# Patient Record
Sex: MALE | Race: BLACK OR AFRICAN AMERICAN | NOT HISPANIC OR LATINO | Employment: STUDENT | URBAN - METROPOLITAN AREA
[De-identification: names, ages, dates, MRNs, and addresses within clinical notes are randomized per-mention and may not be internally consistent; named-entity substitution may affect disease eponyms.]

---
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
## 2022-02-08 ENCOUNTER — OFFICE VISIT (OUTPATIENT)
Dept: PEDIATRICS CLINIC | Facility: CLINIC | Age: 22
End: 2022-02-08
Payer: COMMERCIAL

## 2022-02-08 VITALS
DIASTOLIC BLOOD PRESSURE: 88 MMHG | RESPIRATION RATE: 16 BRPM | HEART RATE: 72 BPM | WEIGHT: 282.4 LBS | BODY MASS INDEX: 39.53 KG/M2 | SYSTOLIC BLOOD PRESSURE: 118 MMHG | HEIGHT: 71 IN

## 2022-02-08 DIAGNOSIS — Z13.31 SCREENING FOR DEPRESSION: ICD-10-CM

## 2022-02-08 DIAGNOSIS — J45.20 MILD INTERMITTENT ASTHMA WITHOUT COMPLICATION: ICD-10-CM

## 2022-02-08 DIAGNOSIS — Z71.3 NUTRITIONAL COUNSELING: ICD-10-CM

## 2022-02-08 DIAGNOSIS — E03.8 OTHER SPECIFIED HYPOTHYROIDISM: ICD-10-CM

## 2022-02-08 DIAGNOSIS — Z00.00 ENCOUNTER FOR GENERAL ADULT MEDICAL EXAMINATION WITHOUT ABNORMAL FINDINGS: ICD-10-CM

## 2022-02-08 DIAGNOSIS — E03.8 HYPOTHYROIDISM DUE TO HASHIMOTO'S THYROIDITIS: ICD-10-CM

## 2022-02-08 DIAGNOSIS — Z71.82 EXERCISE COUNSELING: ICD-10-CM

## 2022-02-08 DIAGNOSIS — E06.3 HYPOTHYROIDISM DUE TO HASHIMOTO'S THYROIDITIS: ICD-10-CM

## 2022-02-08 DIAGNOSIS — E66.9 BMI (BODY MASS INDEX) PEDIATRIC, > 99% FOR AGE, OBESE CHILD, TERTIARY CARE INTERVENTION: ICD-10-CM

## 2022-02-08 DIAGNOSIS — Z23 ENCOUNTER FOR IMMUNIZATION: Primary | ICD-10-CM

## 2022-02-08 PROCEDURE — 92551 PURE TONE HEARING TEST AIR: CPT | Performed by: PEDIATRICS

## 2022-02-08 PROCEDURE — 99173 VISUAL ACUITY SCREEN: CPT | Performed by: PEDIATRICS

## 2022-02-08 PROCEDURE — 90471 IMMUNIZATION ADMIN: CPT | Performed by: PEDIATRICS

## 2022-02-08 PROCEDURE — 90686 IIV4 VACC NO PRSV 0.5 ML IM: CPT | Performed by: PEDIATRICS

## 2022-02-08 PROCEDURE — 96127 BRIEF EMOTIONAL/BEHAV ASSMT: CPT | Performed by: PEDIATRICS

## 2022-02-08 PROCEDURE — 90472 IMMUNIZATION ADMIN EACH ADD: CPT | Performed by: PEDIATRICS

## 2022-02-08 PROCEDURE — 90651 9VHPV VACCINE 2/3 DOSE IM: CPT | Performed by: PEDIATRICS

## 2022-02-08 PROCEDURE — 99395 PREV VISIT EST AGE 18-39: CPT | Performed by: PEDIATRICS

## 2022-02-08 NOTE — PATIENT INSTRUCTIONS
Please call me anytime  Work on our plans  email in 1-2 months and let me know how you are doing    This is a list of Counsellor/Psychology groups in our area:    Please consider calling your insurance company to make sure your health needs will be covered  Dr Joseph Hirsch and Jessica Roberts [address] [facility] [place] and [facility], [address]   [place] [phone]  Deja Henson [phone]  Dr Magda Graham [phone]  Dr Jesus Chapa (younger children)  [facility]    1  Encounter for immunization  - HPV VACCINE 9 VALENT IM  - influenza vaccine, quadrivalent, 0 5 mL, preservative-free, for adult and pediatric patients 6 mos+ (AFLURIA, FLUARIX, FLULAVAL, FLUZONE)    2  Screening for depression    3  Encounter for general adult medical examination without abnormal findings    4  BMI (body mass index) pediatric, > 99% for age, obese child, tertiary care intervention  - Lipid panel; Future  - Hemoglobin A1C; Future  - Comprehensive metabolic panel; Future  - CBC and differential; Future  - TSH, 3rd generation; Future  - T4, free; Future  - Ambulatory Referral to Pediatric Endocrinology; Future  - Ambulatory Referral to Nutrition Services; Future    5  Other specified hypothyroidism    - Ambulatory Referral to Pediatric Endocrinology; Future      My [place] Crisis Number: Alison Saul [phone]    National Suicide Hotline: 0-943.702.3341    Crisis Text Line: Text "Home" to 352386    Please call to schedule an appointment with psychiatrist/therapist:  [address] [facility]  [phone]      Wellness Visit for [facility]:   A wellness visit  is when you see your healthcare provider to get screened for health problems   Your healthcare provider will also give you advice on how to stay healthy  Write down your questions so you remember to ask them  Ask your healthcare provider how often you should have a wellness visit  What happens at a wellness visit:  Your healthcare provider will ask about your health, and your family history of health problems  This includes high blood pressure, heart disease, and cancer  He or she will ask if you have symptoms that concern you, if you smoke, and about your mood  You may also be asked about your intake of medicines, supplements, food, and alcohol  Any of the following may be done:  · Your weight  will be checked  Your height may also be checked so your body mass index (BMI) can be calculated  Your BMI shows if you are at a healthy weight  · Your blood pressure  and heart rate will be checked  Your temperature may also be checked  · Blood and urine tests  may be done  Blood tests may be done to check your cholesterol levels  Abnormal cholesterol levels increase your risk for heart disease and stroke  You may also need a blood or urine test to check for diabetes if you are at increased risk  Urine tests may be done to look for signs of an infection or kidney disease  · A physical exam  includes checking your heartbeat and lungs with a stethoscope  Your healthcare provider may also check your skin to look for sun damage  · Screening tests  may be recommended  A screening test is done to check for diseases that may not cause symptoms  The screening tests you may need depend on your age, gender, family history, and lifestyle habits  For example, colorectal screening may be recommended if you are 48years old or older  Screening tests you need if you are a woman:   · A Pap smear  is used to screen for cervical cancer  Pap smears are usually done every 3 to 5 years depending on your age   You may need them more often if you have had abnormal Pap smear test results in the past  Ask your healthcare provider how often you should have a Pap smear  · A mammogram  is an x-ray of your breasts to screen for breast cancer  Experts recommend mammograms every 2 years starting at age 48 years  You may need a mammogram at age 52 years or younger if you have an increased risk for breast cancer  Talk to your healthcare provider about when you should start having mammograms and how often you need them  Vaccines you may need:   · Get an influenza vaccine  every year  The influenza vaccine protects you from the flu  Several types of viruses cause the flu  The viruses change over time, so new vaccines are made each year  · Get a tetanus-diphtheria (Td) booster vaccine  every 10 years  This vaccine protects you against tetanus and diphtheria  Tetanus is a severe infection that may cause painful muscle spasms and lockjaw  Diphtheria is a severe bacterial infection that causes a thick covering in the back of your mouth and throat  · Get a human papillomavirus (HPV) vaccine  if you are female and aged 23 to 32 or male 23 to 24 and never received it  This vaccine protects you from HPV infection  HPV is the most common infection spread by sexual contact  HPV may also cause vaginal, penile, and anal cancers  · Get a pneumococcal vaccine  if you are aged 72 years or older  The pneumococcal vaccine is an injection given to protect you from pneumococcal disease  Pneumococcal disease is an infection caused by pneumococcal bacteria  The infection may cause pneumonia, meningitis, or an ear infection  · Get a shingles vaccine  if you are 60 or older, even if you have had shingles before  The shingles vaccine is an injection to protect you from the varicella-zoster virus  This is the same virus that causes chickenpox  Shingles is a painful rash that develops in people who had chickenpox or have been exposed to the virus  How to eat healthy:  My Plate is a model for planning healthy meals   It shows the types and amounts of foods that should go on your plate  Fruits and vegetables make up about half of your plate, and grains and protein make up the other half  A serving of dairy is included on the side of your plate  The amount of calories and serving sizes you need depends on your age, gender, weight, and height  Examples of healthy foods are listed below:  · Eat a variety of vegetables  such as dark green, red, and orange vegetables  You can also include canned vegetables low in sodium (salt) and frozen vegetables without added butter or sauces  · Eat a variety of fresh fruits , canned fruit in 100% juice, frozen fruit, and dried fruit  · Include whole grains  At least half of the grains you eat should be whole grains  Examples include whole-wheat bread, wheat pasta, brown rice, and whole-grain cereals such as oatmeal     · Eat a variety of protein foods such as seafood (fish and shellfish), lean meat, and poultry without skin (turkey and chicken)  Examples of lean meats include pork leg, shoulder, or tenderloin, and beef round, sirloin, tenderloin, and extra lean ground beef  Other protein foods include eggs and egg substitutes, beans, peas, soy products, nuts, and seeds  · Choose low-fat dairy products such as skim or 1% milk or low-fat yogurt, cheese, and cottage cheese  · Limit unhealthy fats  such as butter, hard margarine, and shortening  Exercise:  Exercise at least 30 minutes per day on most days of the week  Some examples of exercise include walking, biking, dancing, and swimming  You can also fit in more physical activity by taking the stairs instead of the elevator or parking farther away from stores  Include muscle strengthening activities 2 days each week  Regular exercise provides many health benefits  It helps you manage your weight, and decreases your risk for type 2 diabetes, heart disease, stroke, and high blood pressure  Exercise can also help improve your mood   Ask your healthcare provider about the best exercise plan for you        General health and safety guidelines:   · Do not smoke  Nicotine and other chemicals in cigarettes and cigars can cause lung damage  Ask your healthcare provider for information if you currently smoke and need help to quit  E-cigarettes or smokeless tobacco still contain nicotine  Talk to your healthcare provider before you use these products  · Limit alcohol  A drink of alcohol is 12 ounces of beer, 5 ounces of wine, or 1½ ounces of liquor  · Lose weight, if needed  Being overweight increases your risk of certain health conditions  These include heart disease, high blood pressure, type 2 diabetes, and certain types of cancer  · Protect your skin  Do not sunbathe or use tanning beds  Use sunscreen with a SPF 15 or higher  Apply sunscreen at least 15 minutes before you go outside  Reapply sunscreen every 2 hours  Wear protective clothing, hats, and sunglasses when you are outside  · Drive safely  Always wear your seatbelt  Make sure everyone in your car wears a seatbelt  A seatbelt can save your life if you are in an accident  Do not use your cell phone when you are driving  This could distract you and cause an accident  Pull over if you need to make a call or send a text message  · Practice safe sex  Use latex condoms if are sexually active and have more than one partner  Your healthcare provider may recommend screening tests for sexually transmitted infections (STIs)  · Wear helmets, lifejackets, and protective gear  Always wear a helmet when you ride a bike or motorcycle, go skiing, or play sports that could cause a head injury  Wear protective equipment when you play sports  Wear a lifejacket when you are on a boat or doing water sports  © Copyright [employer] 2021 Information is for End User's use only and may not be sold, redistributed or otherwise used for commercial purposes   All illustrations and images included in CareNotes® are the copyrighted property of [employer]  or [facility]  The above information is an  only  It is not intended as medical advice for individual conditions or treatments  Talk to your doctor, nurse or pharmacist before following any medical regimen to see if it is safe and effective for you  Obesity   AMBULATORY CARE:   Obesity  is when your body mass index (BMI) is greater than 30  Your healthcare provider will use your height and weight to measure your BMI  The risks of obesity include  many health problems, including injuries or physical disability  You may need tests to check for the following:  · Diabetes    · High blood pressure or high cholesterol    · Heart disease    · Stroke    · Gallbladder or liver disease    · Cancer of the colon, breast, prostate, liver, or kidney    · Sleep apnea    · Arthritis or gout    Seek care immediately if:   · You have a severe headache, confusion, or difficulty speaking  · You have weakness on one side of your body  · You have chest pain, sweating, or shortness of breath  Call your doctor if:   · You have symptoms of gallbladder or liver disease, such as pain in your upper abdomen  · You have knee or hip pain and discomfort while walking  · You have symptoms of diabetes, such as intense hunger and thirst, and frequent urination  · You have symptoms of sleep apnea, such as snoring or daytime sleepiness  · You have questions or concerns about your condition or care  Treatment for obesity  focuses on helping you lose weight to improve your health  Even a small decrease in BMI can reduce the risk for many health problems  Your healthcare provider will help you set a weight-loss goal   · Lifestyle changes  are the first step in treating obesity  These include making healthy food choices and getting regular physical activity  Your healthcare provider may suggest a weight-loss program that involves coaching, education, and therapy      · Medicine  may help you lose weight when it is used with a healthy foods and physical activity  · Surgery  can help you lose weight if you are very obese and have other health problems  There are several types of weight-loss surgery  Ask your healthcare provider for more information  Be successful losing weight:   · Set small, realistic goals  An example of a small goal is to walk for 20 minutes 5 days a week  Anther goal is to lose 5% of your body weight  · Tell friends, family members, and coworkers about your goals  and ask for their support  Ask a friend to lose weight with you, or join a weight-loss support group  · Identify foods or triggers that may cause you to overeat , and find ways to avoid them  Remove tempting high-calorie foods from your home and workplace  Place a bowl of fresh fruit on your kitchen counter  If stress causes you to eat, then find other ways to cope with stress  A counselor or therapist may be able to help you  · Keep a diary to track what you eat and drink  Also write down how many minutes of physical activity you do each day  Weigh yourself once a week and record it in your diary  Eating changes: You will need to eat 500 to 1,000 fewer calories each day than you currently eat to lose 1 to 2 pounds a week  The following changes will help you cut calories:  · Eat smaller portions  Use small plates, no larger than 9 inches in diameter  Fill your plate half full of fruits and vegetables  Measure your food using measuring cups until you know what a serving size looks like  · Eat 3 meals and 1 or 2 snacks each day  Plan your meals in advance  Aman Cox and eat at home most of the time  Eat slowly  Do not skip meals  Skipping meals can lead to overeating later in the day  This can make it harder for you to lose weight  Talk with a dietitian to help you make a meal plan and schedule that is right for you      · Eat fruits and vegetables at every meal   They are low in calories and high in fiber, which makes you feel full  Do not add butter, margarine, or cream sauce to vegetables  Use herbs to season steamed vegetables  · Eat less fat and fewer fried foods  Eat more baked or grilled chicken and fish  These protein sources are lower in calories and fat than red meat  Limit fast food  Dress your salads with olive oil and vinegar instead of bottled dressing  · Limit the amount of sugar you eat  Do not drink sugary beverages  Limit alcohol  Activity changes:  Physical activity is good for your body in many ways  It helps you burn calories and build strong muscles  It decreases stress and depression, and improves your mood  It can also help you sleep better  Talk to your healthcare provider before you begin an exercise program   · Exercise for at least 30 minutes 5 days a week  Start slowly  Set aside time each day for physical activity that you enjoy and that is convenient for you  It is best to do both weight training and an activity that increases your heart rate, such as walking, bicycling, or swimming  · Find ways to be more active  Do yard work and housecleaning  Walk up the stairs instead of using elevators  Spend your leisure time going to events that require walking, such as outdoor festivals or fairs  This extra physical activity can help you lose weight and keep it off  Follow up with your doctor as directed: You may need to meet with a dietitian  Write down your questions so you remember to ask them during your visits  © Copyright [employer] 2021 Information is for End User's use only and may not be sold, redistributed or otherwise used for commercial purposes  All illustrations and images included in CareNotes® are the copyrighted property of [employer]  or [facility] Miguel Bledsoe   The above information is an  only  It is not intended as medical advice for individual conditions or treatments   Talk to your doctor, nurse or pharmacist before following any medical regimen to see if it is safe and effective for you  Cigarette Smoking and Your Health   AMBULATORY CARE:   Risks to your health if you smoke:  Nicotine and other chemicals found in tobacco and e-cigarettes can damage every cell in your body  Even if you are a light smoker, you have an increased risk for cancer, heart disease, and lung disease  If you are pregnant or have diabetes, smoking increases your risk for complications  Nicotine can affect an adolescent's developing brain  This can lead to trouble thinking, learning, or paying attention  Benefits to your health if you stop smoking:   · You decrease respiratory symptoms such as coughing, wheezing, and shortness of breath  · You reduce your risk for cancers of the lung, mouth, throat, kidney, bladder, pancreas, stomach, and cervix  If you already have cancer, you increase the benefits of chemotherapy  You also reduce your risk for cancer returning or a second cancer from developing  · You reduce your risk for heart disease, blood clots, heart attack, and stroke  · You reduce your risk for lung infections, and diseases such as pneumonia, asthma, chronic bronchitis, and emphysema  · Your circulation improves  More oxygen can be delivered to your body  If you have diabetes, you lower your risk for complications, such as kidney, artery, and eye diseases  You also lower your risk for nerve damage  Nerve damage can lead to amputations, poor vision, and blindness  · You improve your body's ability to heal and to fight infections  · An adolescent can help his or her brain and body develop in a healthy way  Talk to your adolescent about all the health risks of nicotine  If you can, start talking about nicotine when your child is younger than 12 years  This may make it easier for him or her not to start using nicotine as a teenager or adult  Explain to him or her that it is best never to start   It can be hard to try to quit later  Benefits to the health of others if you stop smoking:  Tobacco is harmful to nonsmokers who breathe in your secondhand smoke  The following are ways the health of others around you may improve when you stop smoking:  · You lower the risks for lung cancer and heart disease in nonsmoking adults  · If you are pregnant, you lower the risk for miscarriage, early delivery, low birth weight, and stillbirth  You also lower your baby's risk for SIDS, obesity, developmental delay, and neurobehavioral problems, such as ADHD  · If you have children, you lower their risk for ear infections, colds, pneumonia, bronchitis, and asthma  Follow up with your doctor as directed:  Write down your questions so you remember to ask them during your visits  For support and more information:   · American Lung Association  [address] [facility]  [address]  Phone: [facility] [address]  Phone: 5- 981 - [phone]  Web Address: [employer]    · Smokefree  gov  Phone: 0- 101 - 845-9460  Web Address: [employer] smokefree  [address] [place] [address] 2021 Information is for End User's use only and may not be sold, redistributed or otherwise used for commercial purposes  All illustrations and images included in CareNotes® are the copyrighted property of A D A M , Inc  or [address] [place], [place] [address]   The above information is an  only  It is not intended as medical advice for individual conditions or treatments  Talk to your doctor, nurse or pharmacist before following any medical regimen to see if it is safe and effective for you  Cholesterol and Your Health   AMBULATORY CARE:   Cholesterol  is a waxy, fat-like substance  Your body uses cholesterol to make hormones and new cells, and to protect nerves  Cholesterol is made by your body  It also comes from certain foods you eat, such as meat and dairy products  Your healthcare provider can help you set goals for your cholesterol levels   He or she can help you create a plan to meet your goals  Cholesterol level goals: Your cholesterol level goals depend on your risk for heart disease, your age, and your other health conditions  The following are general guidelines:  · Total cholesterol  includes low-density lipoprotein (LDL), high-density lipoprotein (HDL), and triglyceride levels  The total cholesterol level should be lower than 200 mg/dL and is best at about 150 mg/dL  · LDL cholesterol  is called bad cholesterol  because it forms plaque in your arteries  As plaque builds up, your arteries become narrow, and less blood flows through  When plaque decreases blood flow to your heart, you may have chest pain  If plaque completely blocks an artery that brings blood to your heart, you may have a heart attack  Plaque can break off and form blood clots  Blood clots may block arteries in your brain and cause a stroke  The level should be less than 130 mg/dL and is best at about 100 mg/dL  · HDL cholesterol  is called good cholesterol  because it helps remove LDL cholesterol from your arteries  It does this by attaching to LDL cholesterol and carrying it to your liver  Your liver breaks down LDL cholesterol so your body can get rid of it  High levels of HDL cholesterol can help prevent a heart attack and stroke  Low levels of HDL cholesterol can increase your risk for heart disease, heart attack, and stroke  The level should be 60 mg/dL or higher  · Triglycerides  are a type of fat that store energy from foods you eat  High levels of triglycerides also cause plaque buildup  This can increase your risk for a heart attack or stroke  If your triglyceride level is high, your LDL cholesterol level may also be high  The level should be less than 150 mg/dL      Any of the following can increase your risk for high cholesterol:   · Smoking cigarettes    · Being overweight or obese, or not getting enough exercise    · Drinking large amounts of alcohol    · A medical condition such as hypertension (high blood pressure) or diabetes    · Certain genes passed from your parents to you    · Age older than 65 years    What you need to know about having your cholesterol levels checked: Adults 21to 39years of age should have their cholesterol levels checked every 4 to 6 years  Adults 45 years or older should have their cholesterol checked every 1 to 2 years  You may need your cholesterol checked more often, or at a younger age, if you have risk factors for heart disease  You may also need to have your cholesterol checked more often if you have other health conditions, such as diabetes  Blood tests are used to check cholesterol levels  Blood tests measure your levels of triglycerides, LDL cholesterol, and HDL cholesterol  How healthy fats affect your cholesterol levels:  Healthy fats, also called unsaturated fats, help lower LDL cholesterol and triglyceride levels  Healthy fats include the following:  · Monounsaturated fats  are found in foods such as olive oil, canola oil, avocado, nuts, and olives  · Polyunsaturated fats,  such as omega 3 fats, are found in fish, such as salmon, trout, and tuna  They can also be found in plant foods such as flaxseed, walnuts, and soybeans  How unhealthy fats affect your cholesterol levels:  Unhealthy fats increase LDL cholesterol and triglyceride levels  They are found in foods high in cholesterol, saturated fat, and trans fat:  · Cholesterol  is found in eggs, dairy, and meat  · Saturated fat  is found in butter, cheese, ice cream, whole milk, and coconut oil  Saturated fat is also found in meat, such as sausage, hot dogs, and bologna  · Trans fat  is found in liquid oils and is used in fried and baked foods  Foods that contain trans fats include chips, crackers, muffins, sweet rolls, microwave popcorn, and cookies  Treatment  for high cholesterol will also decrease your risk of heart disease, heart attack, and stroke   Treatment may include any of the following:  · Lifestyle changes  may include food, exercise, weight loss, and quitting smoking  You may also need to decrease the amount of alcohol you drink  Your healthcare provider will want you to start with lifestyle changes  Other treatment may be added if lifestyle changes are not enough  Your healthcare provider may recommend you work with a team to manage hyperlipidemia  The team may include medical experts such as a dietitian, an exercise or physical therapist, and a behavior therapist  Your family members may be included in helping you create lifestyle changes  · Medicines  may be given to lower your LDL cholesterol, triglyceride levels, or total cholesterol level  You may need medicines to lower your cholesterol if any of the following is true:    ? You have a history of stroke, TIA, unstable angina, or a heart attack  ? Your LDL cholesterol level is 190 mg/dL or higher  ? You are age 36 to 76 years, have diabetes or heart disease risk factors, and your LDL cholesterol is 70 mg/dL or higher  · Supplements  include fish oil, red yeast rice, and garlic  Fish oil may help lower your triglyceride and LDL cholesterol levels  It may also increase your HDL cholesterol level  Red yeast rice may help decrease your total cholesterol level and LDL cholesterol level  Garlic may help lower your total cholesterol level  Do not take any supplements without talking to your healthcare provider  Food changes you can make to lower your cholesterol levels:  A dietitian can help you create a healthy eating plan  He or she can show you how to read food labels and choose foods low in saturated fat, trans fats, and cholesterol  · Decrease the total amount of fat you eat  Choose lean meats, fat-free or 1% fat milk, and low-fat dairy products, such as yogurt and cheese  Try to limit or avoid red meats  Limit or do not eat fried foods or baked goods, such as cookies      · Replace unhealthy fats with healthy fats   Cook foods in olive oil or canola oil  Choose soft margarines that are low in saturated fat and trans fat  Seeds, nuts, and avocados are other examples of healthy fats  · Eat foods with omega-3 fats  Examples include salmon, tuna, mackerel, walnuts, and flaxseed  Eat fish 2 times per week  Pregnant women should not eat fish that have high levels of mercury, such as shark, swordfish, and mary mackerel  · Increase the amount of high-fiber foods you eat  High-fiber foods can help lower your LDL cholesterol  Aim to get between 20 and 30 grams of fiber each day  Fruits and vegetables are high in fiber  Eat at least 5 servings each day  Other high-fiber foods are whole-grain or whole-wheat breads, pastas, or cereals, and brown rice  Eat 3 ounces of whole-grain foods each day  Increase fiber slowly  You may have abdominal discomfort, bloating, and gas if you add fiber to your diet too quickly  · Eat healthy protein foods  Examples include low-fat dairy products, skinless chicken and turkey, fish, and nuts  · Limit foods and drinks that are high in sugar  Your dietitian or healthcare provider can help you create daily limits for high-sugar foods and drinks  The limit may be lower if you have diabetes or another health condition  Limits can also help you lose weight if needed  Lifestyle changes you can make to lower your cholesterol levels:   · Maintain a healthy weight  Ask your healthcare provider what a healthy weight is for you  Ask him or her to help you create a weight loss plan if needed  Weight loss can decrease your total cholesterol and triglyceride levels  Weight loss may also help keep your blood pressure at a healthy level  · Be physically active throughout the day  Physical activity, such as exercise, can help lower your total cholesterol level and maintain a healthy weight  Physical activity can also help increase your HDL cholesterol level   Work with your healthcare provider to create an program that is right for you  Get at least 30 to 40 minutes of moderate physical activity most days of the week  Examples of exercise include brisk walking, swimming, or biking  Also include strength training at least 2 times each week  Your healthcare providers can help you create a physical activity plan  · Do not smoke  Nicotine and other chemicals in cigarettes and cigars can raise your cholesterol levels  Ask your healthcare provider for information if you currently smoke and need help to quit  E-cigarettes or smokeless tobacco still contain nicotine  Talk to your healthcare provider before you use these products  · Limit or do not drink alcohol  Alcohol can increase your triglyceride levels  Ask your healthcare provider before you drink alcohol  Ask how much is okay for you to drink in 24 hours or 1 week  Follow up with your doctor as directed:  Write down your questions so you remember to ask them during your visits  © Copyright [employer] 2021 Information is for End User's use only and may not be sold, redistributed or otherwise used for commercial purposes  All illustrations and images included in CareNotes® are the copyrighted property of A D A M , Inc  or [facility]   The above information is an  only  It is not intended as medical advice for individual conditions or treatments  Talk to your doctor, nurse or pharmacist before following any medical regimen to see if it is safe and effective for you

## 2022-02-08 NOTE — PROGRESS NOTES
Subjective:     Sourav Lantigua is a 23 y o  male who is brought in for this well child visit  History provided by: mother    Current Issues:  Current concerns: none  Well Child 12-18 Year     Interval problems- no ED visits  Nutrition-well balanced, fruit, veg and meats    Dental - q 6 months  Elimination- normal  Behavioral- no concerns  Sleep- through night- takes naps in the day sometimes  Can't sleep at night  Back pain resolved  Orthopedic for calf strain- better- did PT for a bit  Improved now  No been taking medication    In college at [employer]  Lifting 3 days per week, football and basketball    No synthroid taking in the last year  Off metformin also  Due to see endocrine  Wears glasses  Due for dental        Social Hx   : denies     SA :denies, interested in girl and boys romantically  Drugs: marijuana  regular use if has it  Tobacco/vaping: vaping- daily- working on getting of it  Alcohol: one day of the week with friends  In college  Feels safe at home and school  No peer pressures  No recent changes in sleep or behavior  Stress relief activities: denies  Denies SI/HI- denies  Confidential social history after child changed and mom was asked to step out of the room  Feels like sleeping more  Escaping feelings  More down then before  Depression in high school  Went with the flow and improved  Now feels low again  Has long term goals  Would like to travel and see the world  Nice relationships with mom and friends  Cares about others  Asking to have someone to talk to  Counselor  Safety  Home is child-proofed? Yes  There is no smoking in the home  Home has working smoke alarms? Yes  Home has working carbon monoxide alarms? Yes  There is an appropriate car seat in use         Screening  -risk for lead none  -risk for dislipidemia none  -risk for TB none  -risk for anemia none      The following portions of the patient's history were reviewed and updated as appropriate: allergies, current medications, past family history, past medical history, past social history, past surgical history and problem list           Objective:       Vitals:    02/08/22 1220   BP: 118/88   BP Location: Left arm   Patient Position: Sitting   Pulse: 72   Resp: 16   Weight: 128 kg (282 lb 6 4 oz)   Height: 5' 11 38" (1 813 m)     Growth parameters are noted and are appropriate for age  Wt Readings from Last 1 Encounters:   02/08/22 128 kg (282 lb 6 4 oz)     Ht Readings from Last 1 Encounters:   02/08/22 5' 11 38" (1 813 m)      Body mass index is 38 97 kg/m²  Vitals:    02/08/22 1220   BP: 118/88   BP Location: Left arm   Patient Position: Sitting   Pulse: 72   Resp: 16   Weight: 128 kg (282 lb 6 4 oz)   Height: 5' 11 38" (1 813 m)        Hearing Screening    125Hz 250Hz 500Hz 1000Hz 2000Hz 3000Hz 4000Hz 6000Hz 8000Hz   Right ear: 25 25 25 25 25 25 25 25 25   Left ear: 25 25 25 25 25 25 25 25 25      Visual Acuity Screening    Right eye Left eye Both eyes   Without correction:      With correction: 20/20 20/20 16       Physical Exam  Vitals and nursing note reviewed  Constitutional:       Appearance: Normal appearance  He is well-developed and normal weight  HENT:      Head: Normocephalic and atraumatic  Right Ear: Tympanic membrane, ear canal and external ear normal       Left Ear: Ear canal and external ear normal       Nose: Nose normal       Mouth/Throat:      Mouth: Mucous membranes are moist    Eyes:      Extraocular Movements: Extraocular movements intact  Conjunctiva/sclera: Conjunctivae normal       Pupils: Pupils are equal, round, and reactive to light  Cardiovascular:      Rate and Rhythm: Normal rate and regular rhythm  Pulmonary:      Effort: Pulmonary effort is normal       Breath sounds: Normal breath sounds  Abdominal:      General: Bowel sounds are normal  There is no distension  Palpations: Abdomen is soft     Musculoskeletal: General: Normal range of motion  Cervical back: Normal range of motion  Skin:     General: Skin is warm  Neurological:      General: No focal deficit present  Mental Status: He is alert and oriented to person, place, and time  Mental status is at baseline  Psychiatric:         Mood and Affect: Mood normal          Behavior: Behavior normal          Thought Content: Thought content normal          Judgment: Judgment normal        Polite,  Kind    Assessment:     Well adolescent  1  Encounter for immunization     2  Screening for depression     3  Encounter for general adult medical examination without abnormal findings          Plan:         1  Anticipatory guidance discussed  Specific topics reviewed: bicycle helmets, drugs, ETOH, and tobacco, importance of regular dental care, importance of regular exercise, importance of varied diet and limit TV, media violence  BMI Counseling: Body mass index is 38 97 kg/m²  The BMI is above normal  Nutrition recommendations include decreasing portion sizes, consuming healthier snacks, limiting drinks that contain sugar and reducing intake of cholesterol  Exercise recommendations include exercising 3-5 times per week and strength training exercises  Patient referred to nutritionist  Rationale for BMI follow-up plan is due to patient being overweight or obese  Depression Screening and Follow-up Plan: Patient's depression screening was positive with a PHQ-2 score of 5  Their PHQ-9 score was 16          2  Development: appropriate for age    1  Immunizations today: per orders  4  Follow-up visit in 1 year for next well child visit, or sooner as needed  Advised family on good growth and development for age today  Questions were answered regarding but not limited to sleep, dev, feeding for age, growth and behavior  Family appropriate and engaged in conversation    1  Encounter for immunization  Asking about booster COVID vaccine     - HPV VACCINE 9 VALENT IM  - influenza vaccine, quadrivalent, 0 5 mL, preservative-free, for adult and pediatric patients 6 mos+ (AFLURIA, FLUARIX, FLULAVAL, FLUZONE)    2  Screening for depression  counseling during the visit  List given of local couselors as asked  Advised on weaning down from daily Marajuana use  Barby Corcoran will work on this over the next month or 2 and call me with how he is feeling  Reviewed amotivational syndrome  No concerns for self harm at this time  Advised on ways to get help if needed  3  Encounter for general adult medical examination without abnormal findings      4  BMI (body mass index) pediatric, > 99% for age, obese child, tertiary care intervention    - Lipid panel; Future  - Hemoglobin A1C; Future  - Comprehensive metabolic panel; Future  - CBC and differential; Future  - TSH, 3rd generation; Future  - T4, free; Future  - Ambulatory Referral to Pediatric Endocrinology; Future  - Ambulatory Referral to Nutrition Services; Future    5  Other specified hypothyroidism    - Ambulatory Referral to Pediatric Endocrinology; Future    6  Hypothyroidism due to Hashimoto's thyroiditis  Endocrine referral  Blood work ordered  May wait until seeing endo before lab blood work done      7  Mild intermittent asthma without complication  contolled

## 2022-02-08 NOTE — LETTER
February 8, 2022     Patient: Sourav Lantigua   YOB: 2000   Date of Visit: 2/8/2022       To Whom it May Concern:    Sourav Lantigua is under my professional care  He was seen in my office on 2/8/2022  He may return to school on 2/8/22  Please excuse him from his missed class  If you have any questions or concerns, please don't hesitate to call           Sincerely,          Kenneth Gross MD

## 2022-03-03 ENCOUNTER — OFFICE VISIT (OUTPATIENT)
Dept: ENDOCRINOLOGY | Facility: CLINIC | Age: 22
End: 2022-03-03
Payer: COMMERCIAL

## 2022-03-03 VITALS
WEIGHT: 282.6 LBS | DIASTOLIC BLOOD PRESSURE: 78 MMHG | SYSTOLIC BLOOD PRESSURE: 128 MMHG | BODY MASS INDEX: 39.56 KG/M2 | HEART RATE: 110 BPM | HEIGHT: 71 IN

## 2022-03-03 DIAGNOSIS — E03.8 OTHER SPECIFIED HYPOTHYROIDISM: ICD-10-CM

## 2022-03-03 DIAGNOSIS — E55.9 VITAMIN D DEFICIENCY: ICD-10-CM

## 2022-03-03 DIAGNOSIS — E03.8 HYPOTHYROIDISM DUE TO HASHIMOTO'S THYROIDITIS: Primary | ICD-10-CM

## 2022-03-03 DIAGNOSIS — R79.89 ELEVATED LIVER FUNCTION TESTS: ICD-10-CM

## 2022-03-03 DIAGNOSIS — R73.03 PRE-DIABETES: ICD-10-CM

## 2022-03-03 DIAGNOSIS — E06.3 HYPOTHYROIDISM DUE TO HASHIMOTO'S THYROIDITIS: Primary | ICD-10-CM

## 2022-03-03 DIAGNOSIS — E66.9 BMI (BODY MASS INDEX) PEDIATRIC, > 99% FOR AGE, OBESE CHILD, TERTIARY CARE INTERVENTION: ICD-10-CM

## 2022-03-03 DIAGNOSIS — L83 ACANTHOSIS NIGRICANS: ICD-10-CM

## 2022-03-03 PROCEDURE — 99214 OFFICE O/P EST MOD 30 MIN: CPT | Performed by: INTERNAL MEDICINE

## 2022-03-03 NOTE — PROGRESS NOTES
Cc: hypothyroidism    Referring Provider  Isia Aragon [id number]     History of Present Illness:   Sayra Medellin is a 23 y o  male with past medical history hypothyroidism due to Hashimoto's disease since 2015 presents for follow-up  He was on levothyroxine 112 mcg which she stop taking it for last few months  He was diagnosed with hypothyroidism 2015 with significantly elevated TSH of 141 91 uIU/mL (0 35-4 00 uIU/L) and repeat TSH on 6/8/15 revealed TSH of 137 36 mIU/mL with low FT4 of 0 68 ng/dL  Thyroid antibodies done later revealed positive TPO antibody (182) and negative TG antibody (26)  He was started on levothyroxine (6/2015)  his most recent TFT showed TSH of 1 41 and free T4 of 0 88    He reports all day long fatigue and lack of energy, denies sleep  Disturbances, denies hair loss, brittle nails, dry skin, constipation, mood changes,    he denieschanges in neck, changes in voice, dysphagia   Radiation exposure to head/neck/chest    Fhx:   thyroid disorder- hypothyroidism in her grandmother      Patient Active Problem List   Diagnosis    Acanthosis nigricans    Hypothyroidism due to Hashimoto's thyroiditis    Family history of diabetes mellitus type II    Elevated liver function tests    Elevated cholesterol with elevated triglycerides    NAFLD (nonalcoholic fatty liver disease)    Mild intermittent asthma without complication      Past Medical History:   Diagnosis Date    Closed fracture of lateral portion of right tibial plateau with malunion 10/31/2018    Hypothyroidism     NAFLD (nonalcoholic fatty liver disease) 8/30/2016    Prediabetes     Superficial fungus infection of skin       Past Surgical History:   Procedure Laterality Date    NO PAST SURGERIES      ID OPEN RX BILAT TIB PLAT FX Right 11/1/2018    Procedure: OPEN REDUCTION W/ INTERNAL FIXATION (ORIF) TIBIAL PLATEAU;  Surgeon: Dory Carvalho MD;  Location: [facility];  Service: Orthopedics      Family History   Problem Relation Age of Onset    Hypothyroidism Maternal Grandmother     Thyroid disease unspecified Maternal Grandmother     Hypertension Paternal Grandmother     Rheum arthritis Paternal Grandmother     Diabetes Paternal Grandfather     Diabetes unspecified Paternal Grandfather     Other Mother         gastroparesis,prediabetic    JEANMARIE disease Mother    [facility] Migraines Mother     Hypertension Father     Diabetes unspecified Maternal Grandfather      Social History     Tobacco Use    Smoking status: Passive Smoke Exposure - Never Smoker    Smokeless tobacco: Never Used   Substance Use Topics    Alcohol use: No     Allergies   Allergen Reactions    Other      seasonal         Current Outpatient Medications:     levothyroxine 112 mcg tablet, Take 0 5 tablets (56 mcg total) by mouth daily (Patient not taking: Reported on 3/3/2022 ), Disp: 15 tablet, Rfl: 3    Omega-3 Fatty Acids (FISH OIL PO), Take 2 g by mouth (Patient not taking: Reported on 3/3/2022 ), Disp: , Rfl:   Review of Systems   Constitutional: Positive for fatigue and unexpected weight change  Negative for activity change, appetite change, chills, diaphoresis and fever  HENT: Negative for congestion, drooling, ear discharge, ear pain, trouble swallowing and voice change  Eyes: Negative for photophobia, pain, discharge, redness, itching and visual disturbance  Respiratory: Negative for cough, chest tightness, shortness of breath and wheezing  Cardiovascular: Negative for chest pain, palpitations and leg swelling  Gastrointestinal: Negative for abdominal distention, abdominal pain, blood in stool, diarrhea, nausea and vomiting  Endocrine: Negative for cold intolerance, heat intolerance, polydipsia, polyphagia and polyuria  Genitourinary: Negative for dysuria, flank pain, frequency and hematuria     Musculoskeletal: Negative for back pain, gait problem, joint swelling, myalgias, neck pain and neck stiffness  Skin: Negative for color change, pallor, rash and wound  Neurological: Negative for dizziness, tremors, seizures, syncope, speech difficulty, weakness, numbness and headaches  Psychiatric/Behavioral: Negative for agitation  All other systems reviewed and are negative  Physical Exam:  Body mass index is 39 41 kg/m²  /78   Pulse (!) 110   Ht 5' 11" (1 803 m)   Wt 128 kg (282 lb 9 6 oz)   BMI 39 41 kg/m²    Wt Readings from Last 3 Encounters:   03/03/22 128 kg (282 lb 9 6 oz)   02/08/22 128 kg (282 lb 6 4 oz)   03/25/21 135 kg (298 lb) (>99 %, Z= 3 03)*     * Growth percentiles are based on [facility] (Boys, 2-20 Years) data         GEN: NAD  E/n/m nl facies, hearing intact bilat, tongue midline, lips nl  Eyes: no stare or proptosis, nl lids and conjunctiva, EOMI  Neck: trachea midline, thyroid NT to palpation, nl in size, no nodules or neck masses noted, no cervical LAD  CV; heart reg rate s1s2 nl, no m/r/g appreciated, no CASSANDRA  Resp: CTAB, good effort  Ab+BS  Neuro: no tremor, 2+ DTRs in BUE  MS: no c/c in digits, moves all 4 ext, nl muscle bulk, gait nl  Skin: warm and dry, no palmar erythema  Ext: no c/c in digits, no edema bilaterally, 2+ DP and PT pulses bilat,  Psych: nl mood and affect, no gross lapses in memory    DATA:  Labs:     Component      Latest Ref Rng & Units 3/2/2022   Hemoglobin A1C      <5 7 % 5 7 (H)   eAG, EST AVG Glucose      <154 mg/dL 117     Thyroid Stimulating Hormone 0 36 - 3 74 uIU/mL 1 41      T4, Free 0 76 - 1 46 ng/dL 0 88       Ref Range & Units 3/2/22  7:14 AM   Glucose 65 - 99 mg/dL 97    BUN 7 - 28 mg/dL 12    Creatinine 0 53 - 1 30 mg/dL 1 30    Sodium 135 - 145 mmol/L 138    Potassium 3 5 - 5 2 mmol/L 4 3    Chloride 100 - 109 mmol/L 102    Carbon Dioxide 23 - 31 mmol/L 31    Calcium 8 5 - 10 1 mg/dL 10 3 High     Alkaline Phosphatase 35 - 120 U/L 108    Albumin 3 5 - 4 8 g/dL 4 6    Bilirubin, Total 0 2 - 1 0 mg/dL 1 1 High     Comment: Use of this assay is not recommended for patients undergoing treatment with eltrombopag due to the potential for falsely elevated results  Protein, Total 6 3 - 8 3 g/dL 8 0    AST <41 U/L 72 High     ALT <56 U/L 106 High     Anion Gap 3 - 11 5    eGFR, Non- >60 79    eGFR,  >60 92    eGFR Comment  Units: mL/min per 1 73 square meters    Comment:                                                   Impression:  1  Hypothyroidism due to Hashimoto's thyroiditis    2  BMI (body mass index) pediatric, > 99% for age, obese child, tertiary care intervention    3  Vitamin D deficiency    4  Acanthosis nigricans    5  Elevated liver function tests    6  Pre-diabetes           Plan:    Diagnoses and all orders for this visit:    Hypothyroidism due to Hashimoto's thyroiditis:  He is clinically euthyroid and biochemically, however he stop taking levothyroxine almost 6 months ago, previously on 112 mcg once daily but was not taking it consistently  As he is clinically and biochemically euthyroid will monitor his TFT and will resume levothyroxine if needed  He was instructed to call us if he develops any symptoms of hypothyroidism     -     T4, free Lab Collect; Future  -     TSH, 3rd generation Lab Collect; Future    BMI (body mass index) pediatric, > 99% for age, obese child, tertiary care intervention  -     Ambulatory Referral to Pediatric Endocrinology  -     Comprehensive metabolic panel Lab Collect; Future  -     HEMOGLOBIN A1C W/ EAG ESTIMATION Lab Collect; Future  -     T4, free Lab Collect; Future  -     TSH, 3rd generation Lab Collect; Future  -     Vitamin D 25 hydroxy Lab Collect; Future      Vitamin D deficiency; Was advised to start taking vitamin-D supplement 3000 units a day  -     Vitamin D 25 hydroxy Lab Collect; Future      Elevated liver function tests, likely due to nonalcoholic fatty liver disease:   We had a prolonged discussion regarding lifestyle modifications including regular daily exercise and balanced diet in order to lose weight  Has appointment with nutritionist in near future  Pre-diabetes; Referral to nutritionist     Lifestyle modification instruction including regular daily exercise and balanced diet was given           Discussed with the patient and all questioned fully answered  He will call me if any problems arise          Jerald Litten, MD

## 2022-08-10 ENCOUNTER — TRANSCRIBE ORDERS (OUTPATIENT)
Dept: LAB | Facility: HOSPITAL | Age: 22
End: 2022-08-10

## 2022-08-10 ENCOUNTER — LAB (OUTPATIENT)
Dept: LAB | Facility: HOSPITAL | Age: 22
End: 2022-08-10
Attending: PEDIATRICS
Payer: COMMERCIAL

## 2022-08-10 DIAGNOSIS — E06.3 HYPOTHYROIDISM DUE TO HASHIMOTO'S THYROIDITIS: ICD-10-CM

## 2022-08-10 DIAGNOSIS — Z13.0 ENCOUNTER FOR SICKLE-CELL SCREENING: ICD-10-CM

## 2022-08-10 DIAGNOSIS — E03.8 HYPOTHYROIDISM DUE TO HASHIMOTO'S THYROIDITIS: ICD-10-CM

## 2022-08-10 DIAGNOSIS — E66.9 BMI (BODY MASS INDEX) PEDIATRIC, > 99% FOR AGE, OBESE CHILD, TERTIARY CARE INTERVENTION: ICD-10-CM

## 2022-08-10 DIAGNOSIS — E55.9 VITAMIN D DEFICIENCY: ICD-10-CM

## 2022-08-10 DIAGNOSIS — Z13.0 ENCOUNTER FOR SICKLE-CELL SCREENING: Primary | ICD-10-CM

## 2022-08-10 LAB
25(OH)D3 SERPL-MCNC: 18.3 NG/ML (ref 30–100)
ALBUMIN SERPL BCP-MCNC: 4.7 G/DL (ref 3.5–5)
ALP SERPL-CCNC: 79 U/L (ref 46–116)
ALT SERPL W P-5'-P-CCNC: 97 U/L (ref 12–78)
ANION GAP SERPL CALCULATED.3IONS-SCNC: 3 MMOL/L (ref 4–13)
AST SERPL W P-5'-P-CCNC: 58 U/L (ref 5–45)
BASOPHILS # BLD AUTO: 0.03 THOUSANDS/ΜL (ref 0–0.1)
BASOPHILS NFR BLD AUTO: 0 % (ref 0–1)
BILIRUB SERPL-MCNC: 0.53 MG/DL (ref 0.2–1)
BUN SERPL-MCNC: 17 MG/DL (ref 5–25)
CALCIUM SERPL-MCNC: 10.1 MG/DL (ref 8.3–10.1)
CHLORIDE SERPL-SCNC: 104 MMOL/L (ref 96–108)
CHOLEST SERPL-MCNC: 123 MG/DL
CO2 SERPL-SCNC: 29 MMOL/L (ref 21–32)
CREAT SERPL-MCNC: 1.38 MG/DL (ref 0.6–1.3)
EOSINOPHIL # BLD AUTO: 0.12 THOUSAND/ΜL (ref 0–0.61)
EOSINOPHIL NFR BLD AUTO: 2 % (ref 0–6)
ERYTHROCYTE [DISTWIDTH] IN BLOOD BY AUTOMATED COUNT: 13.6 % (ref 11.6–15.1)
EST. AVERAGE GLUCOSE BLD GHB EST-MCNC: 120 MG/DL
GFR SERPL CREATININE-BSD FRML MDRD: 73 ML/MIN/1.73SQ M
GLUCOSE SERPL-MCNC: 80 MG/DL (ref 65–140)
HBA1C MFR BLD: 5.8 %
HCT VFR BLD AUTO: 43.7 % (ref 36.5–49.3)
HDLC SERPL-MCNC: 34 MG/DL
HGB BLD-MCNC: 13.9 G/DL (ref 12–17)
IMM GRANULOCYTES # BLD AUTO: 0.03 THOUSAND/UL (ref 0–0.2)
IMM GRANULOCYTES NFR BLD AUTO: 0 % (ref 0–2)
LDLC SERPL CALC-MCNC: 56 MG/DL (ref 0–100)
LYMPHOCYTES # BLD AUTO: 2.38 THOUSANDS/ΜL (ref 0.6–4.47)
LYMPHOCYTES NFR BLD AUTO: 31 % (ref 14–44)
MCH RBC QN AUTO: 26.2 PG (ref 26.8–34.3)
MCHC RBC AUTO-ENTMCNC: 31.8 G/DL (ref 31.4–37.4)
MCV RBC AUTO: 82 FL (ref 82–98)
MONOCYTES # BLD AUTO: 0.73 THOUSAND/ΜL (ref 0.17–1.22)
MONOCYTES NFR BLD AUTO: 10 % (ref 4–12)
NEUTROPHILS # BLD AUTO: 4.38 THOUSANDS/ΜL (ref 1.85–7.62)
NEUTS SEG NFR BLD AUTO: 57 % (ref 43–75)
NONHDLC SERPL-MCNC: 89 MG/DL
NRBC BLD AUTO-RTO: 0 /100 WBCS
PLATELET # BLD AUTO: 296 THOUSANDS/UL (ref 149–390)
PMV BLD AUTO: 10.7 FL (ref 8.9–12.7)
POTASSIUM SERPL-SCNC: 4.3 MMOL/L (ref 3.5–5.3)
PROT SERPL-MCNC: 8.5 G/DL (ref 6.4–8.4)
RBC # BLD AUTO: 5.31 MILLION/UL (ref 3.88–5.62)
SODIUM SERPL-SCNC: 136 MMOL/L (ref 135–147)
T4 FREE SERPL-MCNC: 0.99 NG/DL (ref 0.78–1.33)
TRIGL SERPL-MCNC: 163 MG/DL
TSH SERPL DL<=0.05 MIU/L-ACNC: 5.58 UIU/ML (ref 0.45–4.5)
WBC # BLD AUTO: 7.67 THOUSAND/UL (ref 4.31–10.16)

## 2022-08-10 PROCEDURE — 80061 LIPID PANEL: CPT

## 2022-08-10 PROCEDURE — 82306 VITAMIN D 25 HYDROXY: CPT

## 2022-08-10 PROCEDURE — 85025 COMPLETE CBC W/AUTO DIFF WBC: CPT

## 2022-08-10 PROCEDURE — 84443 ASSAY THYROID STIM HORMONE: CPT

## 2022-08-10 PROCEDURE — 84439 ASSAY OF FREE THYROXINE: CPT

## 2022-08-10 PROCEDURE — 85660 RBC SICKLE CELL TEST: CPT

## 2022-08-10 PROCEDURE — 80053 COMPREHEN METABOLIC PANEL: CPT

## 2022-08-10 PROCEDURE — 36415 COLL VENOUS BLD VENIPUNCTURE: CPT

## 2022-08-10 PROCEDURE — 83036 HEMOGLOBIN GLYCOSYLATED A1C: CPT

## 2022-08-11 LAB — SICKLE CELLS BLD QL SMEAR: NEGATIVE

## 2023-01-10 ENCOUNTER — TELEPHONE (OUTPATIENT)
Dept: PEDIATRICS CLINIC | Facility: CLINIC | Age: 23
End: 2023-01-10

## 2023-01-23 ENCOUNTER — TELEPHONE (OUTPATIENT)
Dept: PEDIATRICS CLINIC | Facility: CLINIC | Age: 23
End: 2023-01-23

## 2023-01-23 NOTE — TELEPHONE ENCOUNTER
01/23/23 8:21 AM     The office's request has been received, reviewed, and the patient chart updated  The PCP has successfully been removed with a patient attribution note  This message will now be completed      Thank you  Johanny Ramos

## 2023-02-01 NOTE — TELEPHONE ENCOUNTER
01/31/23 10:19 PM     The office's request has been received, reviewed, and noted that it no longer requires attention; duplicate request  This message will now be completed      Thank you  Mariana Montoya

## 2023-07-05 ENCOUNTER — TELEPHONE (OUTPATIENT)
Dept: FAMILY MEDICINE CLINIC | Facility: CLINIC | Age: 23
End: 2023-07-05

## 2023-07-07 ENCOUNTER — OFFICE VISIT (OUTPATIENT)
Dept: FAMILY MEDICINE CLINIC | Facility: CLINIC | Age: 23
End: 2023-07-07

## 2023-07-07 VITALS
HEIGHT: 71 IN | RESPIRATION RATE: 18 BRPM | HEART RATE: 89 BPM | SYSTOLIC BLOOD PRESSURE: 110 MMHG | OXYGEN SATURATION: 98 % | BODY MASS INDEX: 34.55 KG/M2 | TEMPERATURE: 96 F | DIASTOLIC BLOOD PRESSURE: 70 MMHG | WEIGHT: 246.8 LBS

## 2023-07-07 DIAGNOSIS — J45.20 MILD INTERMITTENT ASTHMA WITHOUT COMPLICATION: ICD-10-CM

## 2023-07-07 DIAGNOSIS — Z11.59 ENCOUNTER FOR HEPATITIS C VIRUS SCREENING TEST FOR HIGH RISK PATIENT: ICD-10-CM

## 2023-07-07 DIAGNOSIS — K76.0 NAFLD (NONALCOHOLIC FATTY LIVER DISEASE): ICD-10-CM

## 2023-07-07 DIAGNOSIS — E78.2 ELEVATED CHOLESTEROL WITH ELEVATED TRIGLYCERIDES: ICD-10-CM

## 2023-07-07 DIAGNOSIS — Z83.3 FAMILY HISTORY OF DIABETES MELLITUS TYPE II: ICD-10-CM

## 2023-07-07 DIAGNOSIS — E03.8 HYPOTHYROIDISM DUE TO HASHIMOTO'S THYROIDITIS: ICD-10-CM

## 2023-07-07 DIAGNOSIS — R73.9 HYPERGLYCEMIA: ICD-10-CM

## 2023-07-07 DIAGNOSIS — Z00.00 WELL ADULT EXAM: Primary | ICD-10-CM

## 2023-07-07 DIAGNOSIS — R79.89 ELEVATED LIVER FUNCTION TESTS: ICD-10-CM

## 2023-07-07 DIAGNOSIS — Z11.4 ENCOUNTER FOR SCREENING FOR HIV: ICD-10-CM

## 2023-07-07 DIAGNOSIS — E06.3 HYPOTHYROIDISM DUE TO HASHIMOTO'S THYROIDITIS: ICD-10-CM

## 2023-07-07 DIAGNOSIS — L83 ACANTHOSIS NIGRICANS: ICD-10-CM

## 2023-07-07 DIAGNOSIS — R73.03 PRE-DIABETES: ICD-10-CM

## 2023-07-07 DIAGNOSIS — L81.8 TATTOOS: ICD-10-CM

## 2023-07-07 DIAGNOSIS — Z91.89 ENCOUNTER FOR HEPATITIS C VIRUS SCREENING TEST FOR HIGH RISK PATIENT: ICD-10-CM

## 2023-07-07 DIAGNOSIS — Z02.4 ENCOUNTER FOR DRIVER'S LICENSE HISTORY AND PHYSICAL: ICD-10-CM

## 2023-07-07 NOTE — PROGRESS NOTES
Name: Honorio Maynard      : 2000      MRN: [id number]  Encounter Provider: Moise Douglas PA-C  Encounter Date: 2023   Encounter department: [address] [place] [address]     1. Well adult exam    2. BMI 34.0-34.9,adult  -     Comprehensive metabolic panel  -     Hemoglobin A1C  -     Lipid panel  -     TSH, 3rd generation with Free T4 reflex    3. Encounter for 's license history and physical  -     Comprehensive metabolic panel  -     Hemoglobin A1C  -     Lipid panel  -     TSH, 3rd generation with Free T4 reflex    4. NAFLD (nonalcoholic fatty liver disease)  -     Comprehensive metabolic panel  -     Hemoglobin A1C  -     Lipid panel  -     TSH, 3rd generation with Free T4 reflex    5. Hypothyroidism due to Hashimoto's thyroiditis  -     Comprehensive metabolic panel  -     Hemoglobin A1C  -     Lipid panel  -     TSH, 3rd generation with Free T4 reflex    6. Mild intermittent asthma without complication  -     Comprehensive metabolic panel  -     Hemoglobin A1C  -     Lipid panel  -     TSH, 3rd generation with Free T4 reflex    7. Acanthosis nigricans  -     Comprehensive metabolic panel  -     Hemoglobin A1C  -     Lipid panel  -     TSH, 3rd generation with Free T4 reflex    8. Pre-diabetes  -     Comprehensive metabolic panel  -     Hemoglobin A1C  -     Lipid panel  -     TSH, 3rd generation with Free T4 reflex    9. Hyperglycemia  -     Comprehensive metabolic panel  -     Hemoglobin A1C  -     Lipid panel  -     TSH, 3rd generation with Free T4 reflex    10. Family history of diabetes mellitus type II  -     Comprehensive metabolic panel  -     Hemoglobin A1C  -     Lipid panel  -     TSH, 3rd generation with Free T4 reflex    11. Elevated liver function tests  -     Comprehensive metabolic panel  -     Hemoglobin A1C  -     Lipid panel  -     TSH, 3rd generation with Free T4 reflex    12.  Elevated cholesterol with elevated triglycerides  - Comprehensive metabolic panel  -     Hemoglobin A1C  -     Lipid panel  -     TSH, 3rd generation with Free T4 reflex    13. Encounter for screening for HIV  -     Comprehensive metabolic panel  -     Hemoglobin A1C  -     Lipid panel  -     TSH, 3rd generation with Free T4 reflex  -     : HIV 1/2 AB/AG w Reflex [facility] for 2 yr old and above; Future    14. Encounter for hepatitis C virus screening test for high risk patient  -     Comprehensive metabolic panel  -     Hemoglobin A1C  -     Lipid panel  -     TSH, 3rd generation with Free T4 reflex  -     Hepatitis C antibody; Future    15. Tattoos  -     Comprehensive metabolic panel  -     Hemoglobin A1C  -     Lipid panel  -     TSH, 3rd generation with Free T4 reflex  -     Hepatitis C antibody; Future    I did hold the 's license form until he gets the labs completed first.  - I did recommend he follow-up next week after the blood work and also consents to HIV and hepatitis C screening. BMI Counseling: Body mass index is 34.42 kg/m². The BMI is above normal. Nutrition recommendations include 3-5 servings of fruits/vegetables daily. Exercise recommendations include exercising 3-5 times per week. [employer] software was used to dictate this note. It may contain errors with dictating incorrect words/spelling. Please contact provider directly for any questions. Subjective      Patient presents today for annual physical.  He does not have any other concerns at this time. He states he no longer follows with endocrinology for his thyroid. He states his medication was discontinued but he was supposed to continue follow-up. He also has a history of prediabetes. He states his asthma is been under good control. 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 He has not seen the dentist since 2021  He does wear glasses and he does go to the eye doctor yearly  He does need a moderate healthy diet. He states he eats a lot better when he is playing football  At least 3 days a week he does do calisthenics push-ups and pull-ups. He states overall he is lost about 54 pounds. He does smoke tobacco by vaping, social alcohol use. No drug use. Denies being sexually active. He does have tattoos. He has never had HIV or hepatitis C testing. Review of Systems    Current Outpatient Medications on File Prior to Visit   Medication Sig   • Cholecalciferol 25 MCG (1000 UT) tablet Take 1,000 Units by mouth daily (Patient not taking: Reported on 7/7/2023)   • levothyroxine 112 mcg tablet Take 0.5 tablets (56 mcg total) by mouth daily (Patient not taking: Reported on 3/3/2022 )   • Omega-3 Fatty Acids (FISH OIL PO) Take 2 g by mouth (Patient not taking: Reported on 3/3/2022)       Objective     /70 (BP Location: Left arm, Patient Position: Sitting, Cuff Size: Adult)   Pulse 89   Temp (!) 96 °F (35.6 °C)   Resp 18   Ht 5' 11" (1.803 m)   Wt 112 kg (246 lb 12.8 oz)   SpO2 98%   BMI 34.42 kg/m²     Physical Exam  Constitutional:       General: He is not in acute distress. Appearance: Normal appearance. He is well-developed. He is not ill-appearing, toxic-appearing or diaphoretic. HENT:      Head: Normocephalic and atraumatic. Neck:      Thyroid: No thyromegaly. Cardiovascular:      Rate and Rhythm: Normal rate and regular rhythm. Heart sounds: Normal heart sounds. No murmur heard. Pulmonary:      Effort: Pulmonary effort is normal. No respiratory distress. Breath sounds: Normal breath sounds. No wheezing, rhonchi or rales. Abdominal:      General: Bowel sounds are normal.      Palpations: Abdomen is soft. There is no mass. Tenderness: There is no abdominal tenderness. Musculoskeletal:         General: No deformity. Cervical back: Normal range of motion and neck supple. Right lower leg: No edema. Left lower leg: No edema.    Lymphadenopathy:      Cervical: No cervical adenopathy. Skin:     General: Skin is warm. Neurological:      General: No focal deficit present. Mental Status: He is alert. Psychiatric:         Mood and Affect: Mood normal.         Behavior: Behavior normal.         Thought Content:  Thought content normal.         Judgment: Judgment normal.       aBrbara Jackson PA-C

## 2023-07-12 ENCOUNTER — APPOINTMENT (OUTPATIENT)
Dept: LAB | Age: 23
End: 2023-07-12
Payer: COMMERCIAL

## 2023-07-12 DIAGNOSIS — Z11.59 ENCOUNTER FOR HEPATITIS C VIRUS SCREENING TEST FOR HIGH RISK PATIENT: ICD-10-CM

## 2023-07-12 DIAGNOSIS — Z91.89 ENCOUNTER FOR HEPATITIS C VIRUS SCREENING TEST FOR HIGH RISK PATIENT: ICD-10-CM

## 2023-07-12 DIAGNOSIS — Z11.4 ENCOUNTER FOR SCREENING FOR HIV: ICD-10-CM

## 2023-07-12 DIAGNOSIS — L81.8 TATTOOS: ICD-10-CM

## 2023-07-12 LAB
ALBUMIN SERPL BCP-MCNC: 4.8 G/DL (ref 3.5–5)
ALP SERPL-CCNC: 81 U/L (ref 46–116)
ALT SERPL W P-5'-P-CCNC: 44 U/L (ref 12–78)
ANION GAP SERPL CALCULATED.3IONS-SCNC: 2 MMOL/L
AST SERPL W P-5'-P-CCNC: 34 U/L (ref 5–45)
BILIRUB SERPL-MCNC: 1.06 MG/DL (ref 0.2–1)
BUN SERPL-MCNC: 17 MG/DL (ref 5–25)
CALCIUM SERPL-MCNC: 10.3 MG/DL (ref 8.3–10.1)
CHLORIDE SERPL-SCNC: 106 MMOL/L (ref 96–108)
CHOLEST SERPL-MCNC: 121 MG/DL
CO2 SERPL-SCNC: 28 MMOL/L (ref 21–32)
CREAT SERPL-MCNC: 1.37 MG/DL (ref 0.6–1.3)
GFR SERPL CREATININE-BSD FRML MDRD: 73 ML/MIN/1.73SQ M
GLUCOSE P FAST SERPL-MCNC: 96 MG/DL (ref 65–99)
HCV AB SER QL: NORMAL
HDLC SERPL-MCNC: 46 MG/DL
HIV 1+2 AB+HIV1 P24 AG SERPL QL IA: NORMAL
HIV 2 AB SERPL QL IA: NORMAL
HIV1 AB SERPL QL IA: NORMAL
HIV1 P24 AG SERPL QL IA: NORMAL
LDLC SERPL CALC-MCNC: 58 MG/DL (ref 0–100)
NONHDLC SERPL-MCNC: 75 MG/DL
POTASSIUM SERPL-SCNC: 4.3 MMOL/L (ref 3.5–5.3)
PROT SERPL-MCNC: 8.4 G/DL (ref 6.4–8.4)
SODIUM SERPL-SCNC: 136 MMOL/L (ref 135–147)
TRIGL SERPL-MCNC: 85 MG/DL
TSH SERPL DL<=0.05 MIU/L-ACNC: 2.52 UIU/ML (ref 0.45–4.5)

## 2023-07-12 PROCEDURE — 36415 COLL VENOUS BLD VENIPUNCTURE: CPT

## 2023-07-12 PROCEDURE — 80053 COMPREHEN METABOLIC PANEL: CPT | Performed by: PHYSICIAN ASSISTANT

## 2023-07-12 PROCEDURE — 86803 HEPATITIS C AB TEST: CPT

## 2023-07-12 PROCEDURE — 83036 HEMOGLOBIN GLYCOSYLATED A1C: CPT | Performed by: PHYSICIAN ASSISTANT

## 2023-07-12 PROCEDURE — 87389 HIV-1 AG W/HIV-1&-2 AB AG IA: CPT

## 2023-07-12 PROCEDURE — 80061 LIPID PANEL: CPT | Performed by: PHYSICIAN ASSISTANT

## 2023-07-12 PROCEDURE — 84443 ASSAY THYROID STIM HORMONE: CPT | Performed by: PHYSICIAN ASSISTANT

## 2023-07-13 ENCOUNTER — OFFICE VISIT (OUTPATIENT)
Dept: FAMILY MEDICINE CLINIC | Facility: CLINIC | Age: 23
End: 2023-07-13
Payer: COMMERCIAL

## 2023-07-13 VITALS
SYSTOLIC BLOOD PRESSURE: 110 MMHG | HEIGHT: 71 IN | HEART RATE: 80 BPM | TEMPERATURE: 97.3 F | WEIGHT: 247 LBS | BODY MASS INDEX: 34.58 KG/M2 | OXYGEN SATURATION: 98 % | RESPIRATION RATE: 16 BRPM | DIASTOLIC BLOOD PRESSURE: 70 MMHG

## 2023-07-13 DIAGNOSIS — R73.9 HYPERGLYCEMIA: ICD-10-CM

## 2023-07-13 DIAGNOSIS — R79.89 ELEVATED SERUM CREATININE: Primary | ICD-10-CM

## 2023-07-13 DIAGNOSIS — N18.2 STAGE 2 CHRONIC KIDNEY DISEASE: ICD-10-CM

## 2023-07-13 PROBLEM — R73.03 PRE-DIABETES: Status: RESOLVED | Noted: 2022-03-03 | Resolved: 2023-07-13

## 2023-07-13 LAB
EST. AVERAGE GLUCOSE BLD GHB EST-MCNC: 114 MG/DL
HBA1C MFR BLD: 5.6 %

## 2023-07-13 PROCEDURE — 99214 OFFICE O/P EST MOD 30 MIN: CPT | Performed by: PHYSICIAN ASSISTANT

## 2023-07-13 NOTE — PROGRESS NOTES
Name: Cherelle Almeida      : 2000      MRN: [id number]  Encounter Provider: Alycia Reinoso PA-C  Encounter Date: 2023   Encounter department: [address] [place] [address]     1. Elevated serum creatinine  -     Ambulatory Referral to Nephrology; Future  -     Hemoglobin A1C; Future; Expected date: 2024  -     Comprehensive metabolic panel; Future; Expected date: 2024  -     Lipid panel; Future; Expected date: 2024    2. Stage 2 chronic kidney disease  -     Ambulatory Referral to Nephrology; Future  -     Hemoglobin A1C; Future; Expected date: 2024  -     Comprehensive metabolic panel; Future; Expected date: 2024  -     Lipid panel; Future; Expected date: 2024    3. Hyperglycemia  -     Hemoglobin A1C; Future; Expected date: 2024  -     Comprehensive metabolic panel; Future; Expected date: 2024  -     Lipid panel; Future; Expected date: 2024      Lab results have been reviewed  - His TSH is normal.  He has not been on levothyroxine for over a year. No treatment needed at this time  - His creatinine still elevated at 1.37 previously 1.38. He does not take any NSAIDs over-the-counter and I did advise him to avoid any NSAIDs. I also advised him to avoid any PPIs. I did advise him of the importance of keeping himself well-hydrated with clear liquids. At least 64 ounces a day  - Lipids have returned to normal.  He does exercise any has had a considerable amount of weight loss. Continue to monitor  - Hemoglobin A1c has decreased down to 5.6. Previously he was prediabetic. He will continue on a low carbohydrate diet and exercise. Continue to monitor this level at least every 6 months  -His 's license forms been completed today. Photocopied by Pradeep Varghese to be scanned  - I did recommend he follow-up in 6 months and proceed with blood work prior to that visit.   I did recommend referral to nephrology    [employer] software was used to dictate this note. It may contain errors with dictating incorrect words/spelling. Please contact provider directly for any questions. Subjective      Patient presents today for follow-up of lab results and final completion of his 's license permit physical form. He does not have any concerns at this time. Review of Systems    Current Outpatient Medications on File Prior to Visit   Medication Sig   • Cholecalciferol 25 MCG (1000 UT) tablet Take 1,000 Units by mouth daily (Patient not taking: Reported on 7/13/2023)   • [DISCONTINUED] levothyroxine 112 mcg tablet Take 0.5 tablets (56 mcg total) by mouth daily (Patient not taking: Reported on 3/3/2022 )   • [DISCONTINUED] Omega-3 Fatty Acids (FISH OIL PO) Take 2 g by mouth (Patient not taking: Reported on 3/3/2022)       Objective     /70 (BP Location: Left arm, Patient Position: Sitting, Cuff Size: Large)   Pulse 80   Temp (!) 97.3 °F (36.3 °C) (Tympanic)   Resp 16   Ht 5' 11" (1.803 m)   Wt 112 kg (247 lb)   SpO2 98%   BMI 34.45 kg/m²     Physical Exam  Vitals reviewed. Constitutional:       General: He is not in acute distress. Appearance: Normal appearance. He is not ill-appearing, toxic-appearing or diaphoretic. Musculoskeletal:      Cervical back: Neck supple. Neurological:      General: No focal deficit present. Mental Status: He is alert. Psychiatric:         Mood and Affect: Mood normal.         Behavior: Behavior normal.         Thought Content:  Thought content normal.         Judgment: Judgment normal.       Barbara Jackson PA-C

## 2023-07-18 ENCOUNTER — TELEPHONE (OUTPATIENT)
Dept: NEPHROLOGY | Facility: CLINIC | Age: 23
End: 2023-07-18

## 2023-07-18 NOTE — TELEPHONE ENCOUNTER
New Patient Intake Form   Patient Details   Torie Shen     2000     [id number]     Insurance Information   Name of [address] [place] [address] [place]   Does the patient need an insurance referral? no   If patient has Pitney Candis, please ask if they will be using their Pitney Candis. Appointment Information   Who is calling to schedule? If not patient, what is callers name? Parent   Referring Provider Dr Paolo Huber   Reason for Appt (Diagnosis)  R79.89 (ICD-10-CM) - Elevated serum creatinine  N18.2 (ICD-10-CM) - Stage 2 chronic kidney disease   Does Patient have labs/urine done at [facility]? If not, where do they go? List the date of last lab / urine  *Please try to get labs 2 years back if not at  yes   Has patient been hospitalized recently? If yes, list name and location of hospital they were in no   Has patient been seen by a Nephrologist before? If yes, list name, location and phone number [facility] years ago   Has the patient had renal imaging done? If so, list the most recent date and type of imaging no    Does patient have a history of Kidney Stones? no   Appointment Details   Is there a referral on file?  yes    Appointment Date 11/16/23    Location  [place]   Miscellaneous

## 2023-09-05 PROBLEM — Z02.4 ENCOUNTER FOR DRIVER'S LICENSE HISTORY AND PHYSICAL: Status: RESOLVED | Noted: 2023-07-07 | Resolved: 2023-09-05

## 2023-10-30 ENCOUNTER — APPOINTMENT (OUTPATIENT)
Dept: RADIOLOGY | Age: 23
End: 2023-10-30

## 2023-10-30 ENCOUNTER — OFFICE VISIT (OUTPATIENT)
Dept: URGENT CARE | Age: 23
End: 2023-10-30

## 2023-10-30 VITALS
TEMPERATURE: 97.9 F | OXYGEN SATURATION: 97 % | BODY MASS INDEX: 35.67 KG/M2 | RESPIRATION RATE: 20 BRPM | SYSTOLIC BLOOD PRESSURE: 112 MMHG | WEIGHT: 254.8 LBS | HEART RATE: 75 BPM | HEIGHT: 71 IN | DIASTOLIC BLOOD PRESSURE: 76 MMHG

## 2023-10-30 DIAGNOSIS — M79.672 LEFT FOOT PAIN: ICD-10-CM

## 2023-10-30 DIAGNOSIS — S93.602A SPRAIN OF LEFT FOOT, INITIAL ENCOUNTER: Primary | ICD-10-CM

## 2023-10-30 PROCEDURE — 99213 OFFICE O/P EST LOW 20 MIN: CPT

## 2023-10-30 PROCEDURE — 73630 X-RAY EXAM OF FOOT: CPT

## 2023-10-30 NOTE — LETTER
October 30, 2023     Patient: Jean-Paul Hein   YOB: 2000   Date of Visit: 10/30/2023       To Whom it May Concern:    Jean-Paul Hein was seen in my clinic on 10/30/2023. He may return to school on 10/31/2023 . If you have any questions or concerns, please don't hesitate to call.          Sincerely,          Christie Roger PA-C        CC: No Recipients

## 2023-10-30 NOTE — PROGRESS NOTES
[facility]        NAME: Cassi Bates is a 26 y.o. male  : 2000    MRN: [id number]  DATE: 2023  TIME: 6:25 PM    Assessment and Plan   Sprain of left foot, initial encounter [S93.602A]  1. Sprain of left foot, initial encounter  Ambulatory Referral to Orthopedic Surgery      2. Left foot pain  XR foot 3+ vw left          Left foot x-ray reviewed: No acute abnormalities noted. Pending radiology final read. Patient Instructions     Wear ACE wrap as discussed. Tylenol/ibuprofen as needed  Ice 20 minutes 3-4 times per day  Insulate the skin from the ice to prevent frostbite  Rest and Elevate  Follow up with orthopedic if symptoms do not improve  Follow up with PCP in 3-5 days. Proceed to ER if symptoms worsen. Chief Complaint     Chief Complaint   Patient presents with    Foot Injury     Pt states he fell down approximately 12 steps at home yesterday landing on tile floor. Pt was disoriented, not sure if he hit head. Pt c/o pain and swelling in left foot. Iced and took Naproxen. History of Present Illness       Patient is a 27 yo male with no significant PMH presenting in the clinic today for left foot pain x 1 day. Patient presents with his mother. Patient notes yesterday he woke up abruptly and went to descend the stairs. Patient notes he then tripped and fell down a flight of 12 steps. Patient unsure of head strike. Denies LOC. Patient's mom notes that the patient initially seemed "disoriented ". Patient denies all head injury symptoms at this time. Denies, dizziness, visual changes, photophobia numbness, tingling, fever, chills, chest pain, and SOB. Patient he is "only here because my left foot hurts ". Patient locates their pain to the dorsal medial aspect of the left foot. Admits swelling along dorsal medial aspect of left foot.  Admits pain is exacerbated with walking, dorsiflexion, and plantarflexion Admits the use of Aleve, ice therapy, and elevation for symptom management. Denies prior similar injuries. Review of Systems   Review of Systems   Constitutional:  Negative for chills and fever. Respiratory:  Negative for shortness of breath. Cardiovascular:  Negative for chest pain. Musculoskeletal:  Positive for arthralgias and joint swelling. Skin:  Negative for rash and wound. Neurological:  Negative for numbness.          Current Medications       Current Outpatient Medications:     Cholecalciferol 25 MCG (1000 UT) tablet, Take 1,000 Units by mouth daily (Patient not taking: Reported on 7/13/2023), Disp: , Rfl:     Current Allergies     Allergies as of 10/30/2023 - Reviewed 10/30/2023   Allergen Reaction Noted    Other  08/07/2020            The following portions of the patient's history were reviewed and updated as appropriate: allergies, current medications, past family history, past medical history, past social history, past surgical history and problem list.     Past Medical History:   Diagnosis Date    Asthma 10/13/2011    Closed fracture of lateral portion of right tibial plateau with malunion 10/31/2018    Hypothyroidism     NAFLD (nonalcoholic fatty liver disease) 8/30/2016    Prediabetes     Stage 2 chronic kidney disease 7/13/2023    Superficial fungus infection of skin        Past Surgical History:   Procedure Laterality Date    NO PAST SURGERIES      WI OPTX TIBIAL FX PROX BICONDYLAR W/WO INT FIXJ Right 11/1/2018    Procedure: OPEN REDUCTION W/ INTERNAL FIXATION (ORIF) TIBIAL PLATEAU;  Surgeon: Reba Burger MD;  Location: [facility];  Service: Orthopedics       Family History   Problem Relation Age of Onset    Hypothyroidism Maternal Grandmother     Thyroid disease unspecified Maternal Grandmother     Hypertension Paternal Grandmother     Rheum arthritis Paternal Grandmother     Diabetes Paternal Grandfather     Diabetes unspecified Paternal Grandfather     Other Mother         gastroparesis,prediabetic    JEANMARIE disease Mother Migraines Mother     Hypertension Father     Diabetes unspecified Maternal Grandfather          Medications have been verified. Objective   /76 (BP Location: Right arm, Patient Position: Sitting, Cuff Size: Large)   Pulse 75   Temp 97.9 °F (36.6 °C) (Tympanic)   Resp 20   Ht 5' 11" (1.803 m)   Wt 116 kg (254 lb 12.8 oz)   SpO2 97%   BMI 35.54 kg/m²        Physical Exam     Physical Exam  Vitals reviewed. Constitutional:       General: He is not in acute distress. Appearance: Normal appearance. He is normal weight. He is not ill-appearing. HENT:      Head: Normocephalic. Nose: Nose normal. No congestion or rhinorrhea. Mouth/Throat:      Mouth: Mucous membranes are moist.   Eyes:      General:         Right eye: No discharge. Left eye: No discharge. Conjunctiva/sclera: Conjunctivae normal.   Cardiovascular:      Rate and Rhythm: Normal rate and regular rhythm. Pulses: Normal pulses. Heart sounds: Normal heart sounds. No friction rub. No gallop. Pulmonary:      Effort: Pulmonary effort is normal.      Breath sounds: Normal breath sounds. No wheezing, rhonchi or rales. Musculoskeletal:      Cervical back: Normal range of motion and neck supple. Right lower leg: Normal.      Left lower leg: Normal.      Right ankle: Normal. No tenderness. Normal range of motion. Normal pulse. Left ankle: Normal. No tenderness. Normal range of motion. Normal pulse. Right foot: Normal.      Left foot: Normal range of motion and normal capillary refill. Swelling (along dorsal medial aspect of foot) and tenderness (dorsal medial aspect of left foot) present. No laceration or bony tenderness. Normal pulse. Skin:     General: Skin is warm. Capillary Refill: Capillary refill takes less than 2 seconds. Findings: No rash. Neurological:      Mental Status: He is alert.    Psychiatric:         Mood and Affect: Mood normal.         Behavior: Behavior normal.

## 2023-10-30 NOTE — PATIENT INSTRUCTIONS
Wear ACE wrap as discussed  Tylenol/ibuprofen as needed  Ice 20 minutes 3-4 times per day  Insulate the skin from the ice to prevent frostbite  Rest and Elevate  Follow up with orthopedic if symptoms do not improve  Follow up with PCP in 3-5 days. Proceed to ER if symptoms worsen.

## 2023-11-16 ENCOUNTER — CONSULT (OUTPATIENT)
Dept: NEPHROLOGY | Facility: CLINIC | Age: 23
End: 2023-11-16
Payer: COMMERCIAL

## 2023-11-16 VITALS
HEART RATE: 70 BPM | BODY MASS INDEX: 35.14 KG/M2 | DIASTOLIC BLOOD PRESSURE: 75 MMHG | WEIGHT: 251 LBS | HEIGHT: 71 IN | SYSTOLIC BLOOD PRESSURE: 120 MMHG

## 2023-11-16 DIAGNOSIS — N28.9 RENAL INSUFFICIENCY: Primary | ICD-10-CM

## 2023-11-16 DIAGNOSIS — N18.9 CHRONIC RENAL IMPAIRMENT, UNSPECIFIED CKD STAGE: ICD-10-CM

## 2023-11-16 DIAGNOSIS — N18.2 STAGE 2 CHRONIC KIDNEY DISEASE: ICD-10-CM

## 2023-11-16 DIAGNOSIS — R79.89 ELEVATED SERUM CREATININE: ICD-10-CM

## 2023-11-16 PROCEDURE — 99244 OFF/OP CNSLTJ NEW/EST MOD 40: CPT | Performed by: INTERNAL MEDICINE

## 2023-11-16 NOTE — LETTER
November 16, 2023     Antione Luna PA-C  [address] [place] [address]    Patient: Jair Daniels   YOB: 2000   Date of Visit: 11/16/2023       Dear Dr. Reema Blanc: Thank you for referring Jair Daniels to me for evaluation. Below are my notes for this consultation. If you have questions, please do not hesitate to call me. I look forward to following your patient along with you. Sincerely,        Agatha Corral MD        CC: No Recipients    Agatha Corral MD  11/16/2023  5:24 PM  Sign when Signing Visit  Consultation - Nephrology   Jair Daniels 26 y.o. male MRN: [id number]  Unit/Bed#:  Encounter: [id number]      Assessment/Plan    Assessment / Plan:  1. Renal    The patient appears to have renal insufficiency and even going back about 7 years his creatinine was 1.3. When he was very young this is abnormal he had some evaluation including 2 ultrasounds that showed normal-sized kidneys with no structural abnormality back at that time. He had no proteinuria as assessed by urine protein creatinine ratio. He has no chronic medical conditions that can lead to this. He did play high school football but these slight abnormalities were present before that not only brought that up because he would take Advil occasionally. With lack of proteinuria it is unlikely to have any significant intrinsic process and even though the creatinine is abnormal its been abnormal as I have seen for at least 6 years. There is been no progression. Initially I would have thought potentially could have been a structural congenital problem but ultrasound in the past showed that this was normal and there is no family history of kidney disease. It is possible this just may be his baseline for some unexplained reason but again there is been no progression his blood pressure is excellent because no other sequela I of renal disease.     Labs were checked some months ago so I given him a slip to repeat a BMP in 1 to do a urine protein creatinine ratio to make sure there is no proteinuria present. I will contact them with results and then discuss if further testing is needed or just continue to monitor in the office conservatively. I have spent a total time of 58 minutes on 11/16/23 in caring for this patient including Diagnostic results, Patient and family education, Impressions, Reviewing / ordering tests, medicine, procedures  , and Obtaining or reviewing history  . History of Present Illness  Physician Requesting Consult: No att. providers found  Reason for Consult / Principal Problem: Renal insufficiency  Hx and PE limited by:   HPI: Jose Medrano is a 26y.o. year old male who presents for his first visit. The patient is really been in good health at but apparently years ago they were told his kidney function was not normal he had some testing done and really nothing much was said about it. As they have gotten older it was noted that the kidney functions a little abnormal so has been referred for evaluation and recommendations. He had no complaints today he is in college and is enjoying his studies. History obtained from chart review and the patient and his mother who was present. Consults    Review of Systems   Constitutional:  Negative for activity change, appetite change, chills, fatigue and fever. HENT: Negative. Negative for hearing loss. Eyes: Negative. Negative for visual disturbance. Respiratory: Negative. Negative for cough, chest tightness, shortness of breath and wheezing. Cardiovascular:  Negative for chest pain, palpitations and leg swelling. Gastrointestinal:  Negative for abdominal distention, diarrhea, nausea and vomiting. Genitourinary:  Negative for difficulty urinating, dysuria, flank pain and hematuria. Musculoskeletal:  Negative for arthralgias. Skin: Negative. Negative for rash. Neurological:  Negative for dizziness, syncope, light-headedness and headaches. Psychiatric/Behavioral:  Negative for agitation, confusion, decreased concentration and hallucinations. Historical Information  Patient Active Problem List   Diagnosis   • Acanthosis nigricans   • Family history of diabetes mellitus type II   • Elevated cholesterol with elevated triglycerides   • NAFLD (nonalcoholic fatty liver disease)   • Mild intermittent asthma without complication   • Vitamin D deficiency   • Skin striae   • Primary snoring   • Hyperglycemia   • Allergic rhinitis   • Tattoos   • CRI (chronic renal insufficiency)     Past Medical History:   Diagnosis Date   • Asthma 10/13/2011   • Closed fracture of lateral portion of right tibial plateau with malunion 10/31/2018   • Hypothyroidism    • NAFLD (nonalcoholic fatty liver disease) 8/30/2016   • Prediabetes    • Stage 2 chronic kidney disease 7/13/2023   • Superficial fungus infection of skin    No history of diabetes, heart disease, liver disease, lung disease, hepatitis, cancer, strokes. No history of urinary tract infections. Past Surgical History:   Procedure Laterality Date   • NO PAST SURGERIES     • ME OPTX TIBIAL FX PROX BICONDYLAR W/WO INT FIXJ Right 11/1/2018    Procedure: OPEN REDUCTION W/ INTERNAL FIXATION (ORIF) TIBIAL PLATEAU;  Surgeon: Iris Erickson MD;  Location: [facility] OR;  Service: Orthopedics     Social History  Social History     Substance and Sexual Activity   Alcohol Use No     Social History     Substance and Sexual Activity   Drug Use No   College no tobacco ethanol or drug abuse.   Social History     Tobacco Use   Smoking Status Never   • Passive exposure: Yes   Smokeless Tobacco Never     Family History   Problem Relation Age of Onset   • Hypothyroidism Maternal Grandmother    • Thyroid disease unspecified Maternal Grandmother    • Hypertension Paternal Grandmother    • Rheum arthritis Paternal Grandmother    • Diabetes Paternal Grandfather    • Diabetes unspecified Paternal Grandfather    • Other Mother gastroparesis,prediabetic   • JEANMARIE disease Mother    • Migraines Mother    • Hypertension Father    • Diabetes unspecified Maternal Grandfather      No family history of end-stage renal disease. No history of Alport syndrome or genetic renal diseases. Meds/Allergies  current meds:   No current facility-administered medications for this visit. Allergies   Allergen Reactions   • Other      seasonal       Objective  [unfilled]  Body mass index is 35.01 kg/m². Invasive Devices:        PHYSICAL EXAM:  /75 (BP Location: Left arm, Patient Position: Sitting, Cuff Size: Standard)   Pulse 70   Ht 5' 11" (1.803 m)   Wt 114 kg (251 lb)   BMI 35.01 kg/m²     Physical Exam  Constitutional:       General: He is not in acute distress. Appearance: Normal appearance. He is not ill-appearing or toxic-appearing. HENT:      Head: Normocephalic and atraumatic. Nose: Nose normal.      Mouth/Throat:      Mouth: Mucous membranes are moist.   Eyes:      General: No scleral icterus. Extraocular Movements: Extraocular movements intact. Cardiovascular:      Rate and Rhythm: Normal rate and regular rhythm. Heart sounds: No friction rub. No gallop. Comments: No edema. Pulmonary:      Effort: Pulmonary effort is normal. No respiratory distress. Breath sounds: No wheezing, rhonchi or rales. Abdominal:      General: Bowel sounds are normal. There is no distension. Palpations: Abdomen is soft. Tenderness: There is no abdominal tenderness. There is no rebound. Musculoskeletal:      Cervical back: Normal range of motion and neck supple. Neurological:      General: No focal deficit present. Mental Status: He is alert and oriented to person, place, and time. Mental status is at baseline. Psychiatric:         Mood and Affect: Mood normal.         Behavior: Behavior normal.         Thought Content:  Thought content normal.         Judgment: Judgment normal.           Current Weight: Weight - Scale: 114 kg (251 lb)  First Weight: Weight - Scale: 114 kg (251 lb)    Lab Results:              Invalid input(s): "LABGLOM"        Invalid input(s): "LABALBU"

## 2023-11-16 NOTE — PROGRESS NOTES
Consultation - Nephrology   Maris Christiansen 26 y.o. male MRN: [id number]  Unit/Bed#:  Encounter: [id number]      Assessment/Plan     Assessment / Plan:  1. Renal    The patient appears to have renal insufficiency and even going back about 7 years his creatinine was 1.3. When he was very young this is abnormal he had some evaluation including 2 ultrasounds that showed normal-sized kidneys with no structural abnormality back at that time. He had no proteinuria as assessed by urine protein creatinine ratio. He has no chronic medical conditions that can lead to this. He did play high school football but these slight abnormalities were present before that not only brought that up because he would take Advil occasionally. With lack of proteinuria it is unlikely to have any significant intrinsic process and even though the creatinine is abnormal its been abnormal as I have seen for at least 6 years. There is been no progression. Initially I would have thought potentially could have been a structural congenital problem but ultrasound in the past showed that this was normal and there is no family history of kidney disease. It is possible this just may be his baseline for some unexplained reason but again there is been no progression his blood pressure is excellent because no other sequela I of renal disease. Labs were checked some months ago so I given him a slip to repeat a BMP in 1 to do a urine protein creatinine ratio to make sure there is no proteinuria present. I will contact them with results and then discuss if further testing is needed or just continue to monitor in the office conservatively. I have spent a total time of 58 minutes on 11/16/23 in caring for this patient including Diagnostic results, Patient and family education, Impressions, Reviewing / ordering tests, medicine, procedures  , and Obtaining or reviewing history  .        History of Present Illness   Physician Requesting Consult: No att. providers found  Reason for Consult / Principal Problem: Renal insufficiency  Hx and PE limited by:   HPI: Henrietta Peterson is a 26y.o. year old male who presents for his first visit. The patient is really been in good health at but apparently years ago they were told his kidney function was not normal he had some testing done and really nothing much was said about it. As they have gotten older it was noted that the kidney functions a little abnormal so has been referred for evaluation and recommendations. He had no complaints today he is in college and is enjoying his studies. History obtained from chart review and the patient and his mother who was present. Consults    Review of Systems   Constitutional:  Negative for activity change, appetite change, chills, fatigue and fever. HENT: Negative. Negative for hearing loss. Eyes: Negative. Negative for visual disturbance. Respiratory: Negative. Negative for cough, chest tightness, shortness of breath and wheezing. Cardiovascular:  Negative for chest pain, palpitations and leg swelling. Gastrointestinal:  Negative for abdominal distention, diarrhea, nausea and vomiting. Genitourinary:  Negative for difficulty urinating, dysuria, flank pain and hematuria. Musculoskeletal:  Negative for arthralgias. Skin: Negative. Negative for rash. Neurological:  Negative for dizziness, syncope, light-headedness and headaches. Psychiatric/Behavioral:  Negative for agitation, confusion, decreased concentration and hallucinations.         Historical Information   Patient Active Problem List   Diagnosis    Acanthosis nigricans    Family history of diabetes mellitus type II    Elevated cholesterol with elevated triglycerides    NAFLD (nonalcoholic fatty liver disease)    Mild intermittent asthma without complication    Vitamin D deficiency    Skin striae    Primary snoring    Hyperglycemia    Allergic rhinitis    Tattoos    CRI (chronic renal insufficiency)     Past Medical History:   Diagnosis Date    Asthma 10/13/2011    Closed fracture of lateral portion of right tibial plateau with malunion 10/31/2018    Hypothyroidism     NAFLD (nonalcoholic fatty liver disease) 8/30/2016    Prediabetes     Stage 2 chronic kidney disease 7/13/2023    Superficial fungus infection of skin    No history of diabetes, heart disease, liver disease, lung disease, hepatitis, cancer, strokes. No history of urinary tract infections. Past Surgical History:   Procedure Laterality Date    NO PAST SURGERIES      MO OPTX TIBIAL FX PROX BICONDYLAR W/WO INT FIXJ Right 11/1/2018    Procedure: OPEN REDUCTION W/ INTERNAL FIXATION (ORIF) TIBIAL PLATEAU;  Surgeon: Karina Melara MD;  Location: [facility];  Service: Orthopedics     Social History   Social History     Substance and Sexual Activity   Alcohol Use No     Social History     Substance and Sexual Activity   Drug Use No   College no tobacco ethanol or drug abuse. Social History     Tobacco Use   Smoking Status Never    Passive exposure: Yes   Smokeless Tobacco Never     Family History   Problem Relation Age of Onset    Hypothyroidism Maternal Grandmother     Thyroid disease unspecified Maternal Grandmother     Hypertension Paternal Grandmother     Rheum arthritis Paternal Grandmother     Diabetes Paternal Grandfather     Diabetes unspecified Paternal Grandfather     Other Mother         gastroparesis,prediabetic    JEANMARIE disease Mother     Migraines Mother     Hypertension Father     Diabetes unspecified Maternal Grandfather      No family history of end-stage renal disease. No history of Alport syndrome or genetic renal diseases. Meds/Allergies   current meds:   No current facility-administered medications for this visit. Allergies   Allergen Reactions    Other      seasonal       Objective   [unfilled]  Body mass index is 35.01 kg/m².     Invasive Devices:        PHYSICAL EXAM:  /75 (BP Location: Left arm, Patient Position: Sitting, Cuff Size: Standard)   Pulse 70   Ht 5' 11" (1.803 m)   Wt 114 kg (251 lb)   BMI 35.01 kg/m²     Physical Exam  Constitutional:       General: He is not in acute distress. Appearance: Normal appearance. He is not ill-appearing or toxic-appearing. HENT:      Head: Normocephalic and atraumatic. Nose: Nose normal.      Mouth/Throat:      Mouth: Mucous membranes are moist.   Eyes:      General: No scleral icterus. Extraocular Movements: Extraocular movements intact. Cardiovascular:      Rate and Rhythm: Normal rate and regular rhythm. Heart sounds: No friction rub. No gallop. Comments: No edema. Pulmonary:      Effort: Pulmonary effort is normal. No respiratory distress. Breath sounds: No wheezing, rhonchi or rales. Abdominal:      General: Bowel sounds are normal. There is no distension. Palpations: Abdomen is soft. Tenderness: There is no abdominal tenderness. There is no rebound. Musculoskeletal:      Cervical back: Normal range of motion and neck supple. Neurological:      General: No focal deficit present. Mental Status: He is alert and oriented to person, place, and time. Mental status is at baseline. Psychiatric:         Mood and Affect: Mood normal.         Behavior: Behavior normal.         Thought Content:  Thought content normal.         Judgment: Judgment normal.           Current Weight: Weight - Scale: 114 kg (251 lb)  First Weight: Weight - Scale: 114 kg (251 lb)    Lab Results:              Invalid input(s): "LABGLOM"        Invalid input(s): "LABALBU"

## 2023-11-16 NOTE — PATIENT INSTRUCTIONS
You are here for your first visit it was a pleasure meeting you and thank you for your history. You are here to evaluate your kidney function looking back your creatinine which is the blood test for the kidney function has been 1.3 as far back as 2018 and you even had some kidney evaluation testing back then. A normal test is 1. Patients that have more aggressive kidney disease have a lot of protein in the urine and in the past years was measured and you did not have any. It is good to see that the creatinines been stable all those years without worsening although it is a little abnormal for someone of your age. I looked at the 2 ultrasounds you had in the past and both showed structural normalcy of your kidneys normal size no evidence of blockage. For now lets just repeat blood work as the ones that I have are from July I will give you slips also do a urine to make sure there is no protein. If everything stable we will just continue to monitor. The way to help maintain good kidney health is as you get older maintain a good weight so your kidneys do not need to work harder observe for blood pressure and hopefully will not develop it or any other medical problems. I will call you with the lab results and then we will discuss follow-up.

## 2023-12-27 ENCOUNTER — TELEPHONE (OUTPATIENT)
Dept: NEPHROLOGY | Facility: CLINIC | Age: 23
End: 2023-12-27

## 2023-12-27 NOTE — TELEPHONE ENCOUNTER
----- Message from Tobi Dennis MD sent at 12/25/2023  7:41 AM EST -----  Call and ask when doing repeat labs for me?

## 2024-03-12 NOTE — PROGRESS NOTES
Daily Note     Today's date: 2018  Patient name: Sayra Medellin  : 2000  MRN: [id number]  Referring provider: Norah Rosa MD  Dx:   Encounter Diagnosis     ICD-10-CM    1  Closed fracture of lateral portion of right tibial plateau with malunion S82 121P    2  Orthopedic aftercare Z47 89                   Subjective: Patient reports minimal pain  He states bending his knee is getting easier  Objective: See treatment diary below      Precautions: asthma, prediabetic, hypothyroidism  s/p MCL tear and tibial plateau fracture with ORIF 18  Achieve right knee flexion to 90 degrees, can surpass 90 degrees as tolerated; quad and hip strengthening    Daily Treatment Diary     Manual         PROM of right knee with overpressure  CK CK CK CK CK                                                               Exercise Diary              Quad sets 5"x10 5"x20 5"x30 5"x30 5"x30 5"x30       Heel slides x10 2x10 3x10 3x10 3x10 10" x15       Calf stretch 30"x4 30"x4 30"x4 30"x4 30"x4 30"x4       SLR:flexion  2x10 2x10 3x10 3x10 1# 3x10       SLR: abduction  nv 2x10 3x10 3x10 1# 3x10       SLR: extension  nv 2x10 3x10 3x10 1# 3x10       Prone quad sets  5"x20 5"x30 5"x30 5"x30 1#       Hamstring stretch in long sitting  30"x4 30"x4 30"x4 30"x4 30"x4       Bike   nv x10' at 90 degrees of flexion x10' x10' x10'       LAQ   2x10 3x10 3x10 1# 3x10       Standing SLR: flexion, abduction   nv nv 2x10 ea (wbing on L) 2x10 B       Prone quad stretch    nv 20"x4 20"x4       minisquats     2x10 2x10       Step ups: forward      nv                                                                            Modalities              CP post treatment  x10'  x10' x10' x10'                                   Right knee ROM = 0-105 degrees    Assessment: Patient with improved tolerance to WB out of brace this session  Knee ROM is progressing appropriately at this time   Patient fatigued with ankle Lab reviewed: all OK. Please notify pt. Continue pres meds. weight added to SLR exercises  Progress treatment nv to initiate stair training as tolerated  Goals  1  Patient will be independent in initial HEP for pain and edema management  - ongoing  2  Patient will achieve right knee ROM 0-90 degrees in 2 weeks  - met  3  Patient will achieve right knee ROM 0-120 degrees in 8 weeks  4  Patient will demonstrate 5/5 gross right knee strength in 12 weeks  5  Patient will demonstrate 5/5 gross hip girdle strength in 12 weeks  6  Patient will restore ambulation to PLOF without AD in 12 weeks  7  Patient will return to participation in sports with modifications as necessary at time of discharge  Plan: Continue per plan of care

## 2024-10-25 ENCOUNTER — TELEPHONE (OUTPATIENT)
Age: 24
End: 2024-10-25

## 2025-04-01 ENCOUNTER — TELEPHONE (OUTPATIENT)
Age: 25
End: 2025-04-01

## 2025-05-20 ENCOUNTER — TELEPHONE (OUTPATIENT)
Age: 25
End: 2025-05-20

## 2025-05-20 NOTE — TELEPHONE ENCOUNTER
Left a voicemail informing the patient that they were a no-show. Asked patient to call back to reschedule the appointment.

## 2025-07-03 ENCOUNTER — OFFICE VISIT (OUTPATIENT)
Age: 25
End: 2025-07-03
Payer: COMMERCIAL

## 2025-07-03 VITALS
SYSTOLIC BLOOD PRESSURE: 122 MMHG | OXYGEN SATURATION: 98 % | BODY MASS INDEX: 36.26 KG/M2 | HEART RATE: 62 BPM | DIASTOLIC BLOOD PRESSURE: 80 MMHG | WEIGHT: 259 LBS | RESPIRATION RATE: 16 BRPM | TEMPERATURE: 98.3 F | HEIGHT: 71 IN

## 2025-07-03 DIAGNOSIS — N18.9 CHRONIC RENAL IMPAIRMENT, UNSPECIFIED CKD STAGE: ICD-10-CM

## 2025-07-03 DIAGNOSIS — R73.9 HYPERGLYCEMIA: ICD-10-CM

## 2025-07-03 DIAGNOSIS — Z00.00 WELL ADULT EXAM: Primary | ICD-10-CM

## 2025-07-03 DIAGNOSIS — K76.0 NAFLD (NONALCOHOLIC FATTY LIVER DISEASE): ICD-10-CM

## 2025-07-03 PROCEDURE — 99395 PREV VISIT EST AGE 18-39: CPT | Performed by: PHYSICIAN ASSISTANT

## 2025-07-03 NOTE — ASSESSMENT & PLAN NOTE
-I did encourage increased protein diet  - Increase exercise  - Portion sizes size of fist  - Weigh self daily in the morning with no clothing  - Start metformin 500 mg twice daily with meals.  Advised if tolerating the medication well increase the dosage in 2 to 3 weeks to 2 tablets twice daily.  He has been advised to notify us if indeed he increases the dosage so we can send a new prescription to the pharmacy.  He has been advised of possible GI side effects including nausea, vomiting, diarrhea which usually resolves in several weeks.  If intolerable he will discontinue the medication  - Follow-up in 3 months  Orders:    metFORMIN (GLUCOPHAGE) 500 mg tablet; Take 1 tablet (500 mg total) by mouth 2 (two) times a day with meals    Comprehensive metabolic panel    Hemoglobin A1C

## 2025-07-03 NOTE — ASSESSMENT & PLAN NOTE
Lab Results   Component Value Date    EGFR 73 07/12/2023    EGFR 73 08/10/2022    EGFR  10/08/2018     Not performed on patients less than 18 years of age or greater than 97 years of age    CREATININE 1.37 (H) 07/12/2023    CREATININE 1.38 (H) 08/10/2022    CREATININE 1.30 03/02/2022       Orders:    Comprehensive metabolic panel    Hemoglobin A1C

## 2025-07-03 NOTE — ASSESSMENT & PLAN NOTE
-I did recommend proceeding with a CMP  Orders:    Comprehensive metabolic panel    Hemoglobin A1C

## 2025-07-03 NOTE — PROGRESS NOTES
Name: Javal Reyes      : 2000      MRN: [id number]  Encounter Provider: Barbara Jackson PA-C  Encounter Date: 7/3/2025   Encounter department: [facility]  :  Assessment & Plan  Well adult exam  -I did advise him that he is due for Tdap and he should have this done if he has any type of open wounds  - He is willing to start metformin for weight loss  - Routine physical in 1 year, follow-up in 3 months after starting metformin for weight loss       BMI 36.0-36.9,adult  -I did encourage increased protein diet  - Increase exercise  - Portion sizes size of fist  - Weigh self daily in the morning with no clothing  - Start metformin 500 mg twice daily with meals.  Advised if tolerating the medication well increase the dosage in 2 to 3 weeks to 2 tablets twice daily.  He has been advised to notify us if indeed he increases the dosage so we can send a new prescription to the pharmacy.  He has been advised of possible GI side effects including nausea, vomiting, diarrhea which usually resolves in several weeks.  If intolerable he will discontinue the medication  - Follow-up in 3 months  Orders:    metFORMIN (GLUCOPHAGE) 500 mg tablet; Take 1 tablet (500 mg total) by mouth 2 (two) times a day with meals    Comprehensive metabolic panel    Hemoglobin A1C    Chronic renal impairment, unspecified CKD stage  Lab Results   Component Value Date    EGFR 73 2023    EGFR 73 08/10/2022    EGFR  10/08/2018     Not performed on patients less than 18 years of age or greater than 97 years of age    CREATININE 1.37 (H) 2023    CREATININE 1.38 (H) 08/10/2022    CREATININE 1.30 2022       Orders:    Comprehensive metabolic panel    Hemoglobin A1C    NAFLD (nonalcoholic fatty liver disease)  -I did recommend proceeding with a CMP  Orders:    Comprehensive metabolic panel    Hemoglobin A1C    Hyperglycemia  -Recommend proceeding with an A1c  Orders:    Comprehensive metabolic panel    Hemoglobin  "A1C    M*Modal software was used to dictate this note. It may contain errors with dictating incorrect words/spelling. Please contact provider directly for any questions.          History of Present Illness   Patient presents today for annual physical.  He does not have any concerns at this time.    -He states he has not seen a dentist in a while.  He needs to establish care with a dentist  - He does wear glasses and sees the eye doctor yearly  - He has been trying to eat a healthier diet.  He primarily drinks water throughout the day  - He states he does walk at times for exercise.  He is very active otherwise  - He does vape daily.  He is trying to find ways to wean off of the vaping but is having difficulty doing so  Denies any alcohol or drug use  - He does sleep well through the night  - He is due for a Tdap.      Review of Systems    Objective   /80 (BP Location: Left arm, Patient Position: Sitting, Cuff Size: Large)   Pulse 62   Temp 98.3 °F (36.8 °C) (Temporal)   Resp 16   Ht 5' 11\" (1.803 m)   Wt 117 kg (259 lb)   SpO2 98%   BMI 36.12 kg/m²      Physical Exam  Vitals reviewed.   Constitutional:       General: He is not in acute distress.     Appearance: Normal appearance. He is well-developed. He is not ill-appearing, toxic-appearing or diaphoretic.   HENT:      Head: Normocephalic and atraumatic.      Right Ear: Tympanic membrane normal. There is no impacted cerumen.      Left Ear: Tympanic membrane normal. There is no impacted cerumen.   Neck:      Thyroid: No thyromegaly.     Cardiovascular:      Rate and Rhythm: Normal rate and regular rhythm.      Heart sounds: Normal heart sounds. No murmur heard.  Pulmonary:      Effort: Pulmonary effort is normal. No respiratory distress.      Breath sounds: Normal breath sounds. No wheezing, rhonchi or rales.   Abdominal:      General: Bowel sounds are normal.      Palpations: Abdomen is soft. There is no mass.      Tenderness: There is no abdominal " tenderness.     Musculoskeletal:         General: No deformity.      Cervical back: Neck supple.      Right lower leg: No edema.      Left lower leg: No edema.   Lymphadenopathy:      Cervical: No cervical adenopathy.     Skin:     General: Skin is warm.     Neurological:      General: No focal deficit present.      Mental Status: He is alert.     Psychiatric:         Mood and Affect: Mood normal.         Behavior: Behavior normal.         Thought Content: Thought content normal.         Judgment: Judgment normal.

## 2025-07-07 DIAGNOSIS — Z00.6 ENCOUNTER FOR EXAMINATION FOR NORMAL COMPARISON OR CONTROL IN CLINICAL RESEARCH PROGRAM: ICD-10-CM

## (undated) DEVICE — COBAN 6 IN STERILE

## (undated) DEVICE — SCD SEQUENTIAL COMPRESSION COMFORT SLEEVE MEDIUM KNEE LENGTH: Brand: KENDALL SCD

## (undated) DEVICE — GLOVE INDICATOR PI UNDERGLOVE SZ 8.5 BLUE

## (undated) DEVICE — 3M™ IOBAN™ 2 ANTIMICROBIAL INCISE DRAPE 6648EZ: Brand: IOBAN™ 2

## (undated) DEVICE — CHLORAPREP HI-LITE 26ML ORANGE

## (undated) DEVICE — 2.5MM DRILL BIT/QC/GOLD/110MM

## (undated) DEVICE — 2.8MM DRILL BIT/QC/165MM

## (undated) DEVICE — GAUZE SPONGES,USP TYPE VII GAUZE, 12 PLY: Brand: CURITY

## (undated) DEVICE — GLOVE INDICATOR PI UNDERGLOVE SZ 6.5 BLUE

## (undated) DEVICE — DRAPE C-ARM X-RAY

## (undated) DEVICE — OCCLUSIVE GAUZE STRIP,3% BISMUTH TRIBROMOPHENATE IN PETROLATUM BLEND: Brand: XEROFORM

## (undated) DEVICE — STANDARD SURGICAL GOWN, L: Brand: CONVERTORS

## (undated) DEVICE — 10FR FRAZIER SUCTION HANDLE: Brand: CARDINAL HEALTH

## (undated) DEVICE — 1.6MM KIRSCHNER WIRE W/TROCAR POINT 150MM
Type: IMPLANTABLE DEVICE | Site: TIBIA | Status: NON-FUNCTIONAL
Removed: 2018-11-01

## (undated) DEVICE — DRAPE C-ARMOUR

## (undated) DEVICE — NEEDLE 18 G X 1 1/2

## (undated) DEVICE — NEEDLE HYPO 22G X 1-1/2 IN

## (undated) DEVICE — IMPERVIOUS STOCKINETTE: Brand: DEROYAL

## (undated) DEVICE — PADDING CAST 4 IN  COTTON STRL

## (undated) DEVICE — INTENDED FOR TISSUE SEPARATION, AND OTHER PROCEDURES THAT REQUIRE A SHARP SURGICAL BLADE TO PUNCTURE OR CUT.: Brand: BARD-PARKER SAFETY BLADES SIZE 15, STERILE

## (undated) DEVICE — ACE WRAP 6 IN UNSTERILE

## (undated) DEVICE — WEBRIL 6 IN UNSTERILE

## (undated) DEVICE — INTENDED FOR TISSUE SEPARATION, AND OTHER PROCEDURES THAT REQUIRE A SHARP SURGICAL BLADE TO PUNCTURE OR CUT.: Brand: BARD-PARKER SAFETY BLADES SIZE 10, STERILE

## (undated) DEVICE — PENCIL ELECTROSURG E-Z CLEAN -0035H

## (undated) DEVICE — GLOVE SRG BIOGEL 8

## (undated) DEVICE — 1.6MM DRILL TIP GUIDE WIRE 150MM

## (undated) DEVICE — SYRINGE 20ML LL

## (undated) DEVICE — 3M™ STERI-DRAPE™ U-DRAPE 1015: Brand: STERI-DRAPE™

## (undated) DEVICE — BETHLEHEM UNIV TOTAL KNEE, KIT: Brand: CARDINAL HEALTH

## (undated) DEVICE — GLOVE SRG BIOGEL 6

## (undated) DEVICE — ABDOMINAL PAD: Brand: DERMACEA

## (undated) DEVICE — MEDI-VAC YANKAUER SUCTION HANDLE W/BULBOUS AND CONTROL VENT: Brand: CARDINAL HEALTH

## (undated) DEVICE — PADDING CAST 6IN COTTON STRL

## (undated) DEVICE — GLOVE SRG BIOGEL 7.5

## (undated) DEVICE — THE SIMPULSE SOLO SYSTEM WITH ULTREX RETRACTABLE SPLASH SHIELD TIP: Brand: SIMPULSE SOLO